# Patient Record
Sex: MALE | Race: WHITE | NOT HISPANIC OR LATINO | Employment: OTHER | URBAN - METROPOLITAN AREA
[De-identification: names, ages, dates, MRNs, and addresses within clinical notes are randomized per-mention and may not be internally consistent; named-entity substitution may affect disease eponyms.]

---

## 2020-07-04 ENCOUNTER — APPOINTMENT (OUTPATIENT)
Dept: GENERAL RADIOLOGY | Facility: HOSPITAL | Age: 75
End: 2020-07-04

## 2020-07-04 ENCOUNTER — HOSPITAL ENCOUNTER (EMERGENCY)
Facility: HOSPITAL | Age: 75
Discharge: HOME | End: 2020-07-04
Attending: EMERGENCY MEDICINE | Admitting: EMERGENCY MEDICINE

## 2020-07-04 ENCOUNTER — APPOINTMENT (OUTPATIENT)
Dept: CT IMAGING | Facility: HOSPITAL | Age: 75
End: 2020-07-04

## 2020-07-04 VITALS
HEIGHT: 69 IN | OXYGEN SATURATION: 99 % | BODY MASS INDEX: 16.48 KG/M2 | DIASTOLIC BLOOD PRESSURE: 81 MMHG | TEMPERATURE: 98 F | SYSTOLIC BLOOD PRESSURE: 120 MMHG | WEIGHT: 111.3 LBS | RESPIRATION RATE: 16 BRPM | HEART RATE: 89 BPM
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
## 2020-07-04 DIAGNOSIS — G20 PARKINSON DISEASE (HCC): ICD-10-CM

## 2020-07-04 DIAGNOSIS — R41.0 CONFUSION: ICD-10-CM

## 2020-07-04 DIAGNOSIS — R53.1 WEAKNESS: Primary | ICD-10-CM

## 2020-07-04 LAB
ALBUMIN SERPL-MCNC: 4.5 G/DL (ref 3.5–5.2)
ALBUMIN/GLOB SERPL: 1.7 G/DL
ALP SERPL-CCNC: 70 U/L (ref 39–117)
ALT SERPL W P-5'-P-CCNC: <5 U/L (ref 1–41)
ANION GAP SERPL CALCULATED.3IONS-SCNC: 14 MMOL/L (ref 5–15)
APTT PPP: 31.7 SECONDS (ref 24.3–38.1)
AST SERPL-CCNC: 18 U/L (ref 1–40)
BASOPHILS # BLD AUTO: 0.02 10*3/MM3 (ref 0–0.2)
BASOPHILS NFR BLD AUTO: 0.2 % (ref 0–1.5)
BILIRUB SERPL-MCNC: 1.4 MG/DL (ref 0.2–1.2)
BILIRUB UR QL STRIP: NEGATIVE
BUN SERPL-MCNC: 21 MG/DL (ref 8–23)
BUN/CREAT SERPL: 23.9 (ref 7–25)
CALCIUM SPEC-SCNC: 9.6 MG/DL (ref 8.6–10.5)
CHLORIDE SERPL-SCNC: 97 MMOL/L (ref 98–107)
CLARITY UR: CLEAR
CO2 SERPL-SCNC: 25 MMOL/L (ref 22–29)
COLOR UR: ABNORMAL
CREAT SERPL-MCNC: 0.88 MG/DL (ref 0.76–1.27)
DEPRECATED RDW RBC AUTO: 46.6 FL (ref 37–54)
EOSINOPHIL # BLD AUTO: 0 10*3/MM3 (ref 0–0.4)
EOSINOPHIL NFR BLD AUTO: 0 % (ref 0.3–6.2)
ERYTHROCYTE [DISTWIDTH] IN BLOOD BY AUTOMATED COUNT: 13.1 % (ref 12.3–15.4)
GFR SERPL CREATININE-BSD FRML MDRD: 85 ML/MIN/1.73
GLOBULIN UR ELPH-MCNC: 2.7 GM/DL
GLUCOSE SERPL-MCNC: 228 MG/DL (ref 65–99)
GLUCOSE UR STRIP-MCNC: ABNORMAL MG/DL
HCT VFR BLD AUTO: 41.8 % (ref 37.5–51)
HGB BLD-MCNC: 14.1 G/DL (ref 13–17.7)
HGB UR QL STRIP.AUTO: NEGATIVE
IMM GRANULOCYTES # BLD AUTO: 0.04 10*3/MM3 (ref 0–0.05)
IMM GRANULOCYTES NFR BLD AUTO: 0.5 % (ref 0–0.5)
INR PPP: 1.01 (ref 0.9–1.1)
KETONES UR QL STRIP: ABNORMAL
LEUKOCYTE ESTERASE UR QL STRIP.AUTO: NEGATIVE
LYMPHOCYTES # BLD AUTO: 0.73 10*3/MM3 (ref 0.7–3.1)
LYMPHOCYTES NFR BLD AUTO: 9.1 % (ref 19.6–45.3)
MCH RBC QN AUTO: 32 PG (ref 26.6–33)
MCHC RBC AUTO-ENTMCNC: 33.7 G/DL (ref 31.5–35.7)
MCV RBC AUTO: 95 FL (ref 79–97)
MONOCYTES # BLD AUTO: 0.61 10*3/MM3 (ref 0.1–0.9)
MONOCYTES NFR BLD AUTO: 7.6 % (ref 5–12)
NEUTROPHILS NFR BLD AUTO: 6.64 10*3/MM3 (ref 1.7–7)
NEUTROPHILS NFR BLD AUTO: 82.6 % (ref 42.7–76)
NITRITE UR QL STRIP: NEGATIVE
PH UR STRIP.AUTO: <=5 [PH] (ref 4.5–8)
PLATELET # BLD AUTO: 214 10*3/MM3 (ref 140–450)
PMV BLD AUTO: 9.7 FL (ref 6–12)
POTASSIUM SERPL-SCNC: 4.4 MMOL/L (ref 3.5–5.2)
PROCALCITONIN SERPL-MCNC: 0.1 NG/ML (ref 0.1–0.25)
PROT SERPL-MCNC: 7.2 G/DL (ref 6–8.5)
PROT UR QL STRIP: NEGATIVE
PROTHROMBIN TIME: 13 SECONDS (ref 12.1–15)
RBC # BLD AUTO: 4.4 10*6/MM3 (ref 4.14–5.8)
SODIUM SERPL-SCNC: 136 MMOL/L (ref 136–145)
SP GR UR STRIP: 1.02 (ref 1–1.03)
UROBILINOGEN UR QL STRIP: ABNORMAL
WBC # BLD AUTO: 8.04 10*3/MM3 (ref 3.4–10.8)

## 2020-07-04 PROCEDURE — 70450 CT HEAD/BRAIN W/O DYE: CPT

## 2020-07-04 PROCEDURE — 99285 EMERGENCY DEPT VISIT HI MDM: CPT

## 2020-07-04 PROCEDURE — 81003 URINALYSIS AUTO W/O SCOPE: CPT | Performed by: EMERGENCY MEDICINE

## 2020-07-04 PROCEDURE — 85730 THROMBOPLASTIN TIME PARTIAL: CPT | Performed by: EMERGENCY MEDICINE

## 2020-07-04 PROCEDURE — 71046 X-RAY EXAM CHEST 2 VIEWS: CPT

## 2020-07-04 PROCEDURE — P9612 CATHETERIZE FOR URINE SPEC: HCPCS

## 2020-07-04 PROCEDURE — 99284 EMERGENCY DEPT VISIT MOD MDM: CPT | Performed by: EMERGENCY MEDICINE

## 2020-07-04 PROCEDURE — 85025 COMPLETE CBC W/AUTO DIFF WBC: CPT | Performed by: EMERGENCY MEDICINE

## 2020-07-04 PROCEDURE — 85610 PROTHROMBIN TIME: CPT | Performed by: EMERGENCY MEDICINE

## 2020-07-04 PROCEDURE — 80053 COMPREHEN METABOLIC PANEL: CPT | Performed by: EMERGENCY MEDICINE

## 2020-07-04 PROCEDURE — 72100 X-RAY EXAM L-S SPINE 2/3 VWS: CPT

## 2020-07-04 PROCEDURE — 99284 EMERGENCY DEPT VISIT MOD MDM: CPT

## 2020-07-04 PROCEDURE — 84145 PROCALCITONIN (PCT): CPT | Performed by: EMERGENCY MEDICINE

## 2020-07-04 RX ORDER — SODIUM CHLORIDE 0.9 % (FLUSH) 0.9 %
10 SYRINGE (ML) INJECTION AS NEEDED
Status: DISCONTINUED | OUTPATIENT
Start: 2020-07-04 | End: 2020-07-04 | Stop reason: HOSPADM

## 2020-07-04 NOTE — ED NOTES
Pt stated he is sleepy but still itchy. No noticeable change in rash at this time     Ella Jacob, DAO  07/04/20 5596

## 2020-07-04 NOTE — ED PROVIDER NOTES
Subjective     History provided by:  EMS personnel, patient and relative  History limited by:  Dementia    History of Present Illness    Chief complaint: Weakness, confusion, falls    Location: Generalized symptoms    Quality/Severity: Generalized weakness    Timing/Duration: Ongoing for several days, seemingly progressively worse for the past 1 to 2 weeks    Modifying Factors: None    Narrative: This patient arrives via EMS for evaluation of generalized weakness with confusion and some recent falls.  Patient has a history of parkinsonism with dementia.  He moved in with his son back in February of this year.  The patient previously resided in [place] before coming here.  [facility] [place]  Patient has not been on any new medications recently.  He has not had any fevers or cough or cold symptoms.  He denies any vomiting or diarrhea.  He has had at least 2 significant falls in recent days.  During 1 of these falls, he may have struck his head against the wall, however details are not very clear.  The patient is supposed to walk with a walker at home, however he apparently refuses to do so according to his son, Ata, (with whom I spoke on the telephone.) Patient complains of some pain in the lower back area.  But he has no other pains elsewhere to his body.  His son tells me that the patient has been acting more confused than normal this week.  They wanted him to come here for evaluation today to see if there are any new problems.    Associated Symptoms: As above    Review of Systems   Constitutional: Negative for diaphoresis and fever.   Respiratory: Negative for cough and shortness of breath.    Cardiovascular: Negative for chest pain.   Gastrointestinal: Negative for abdominal pain, diarrhea and vomiting.   Genitourinary: Negative for dysuria.   Musculoskeletal: Positive for back pain and myalgias.   Skin: Negative for color change and rash.   Neurological: Positive  for weakness. Negative for syncope and headaches.   Psychiatric/Behavioral: Positive for confusion.   All other systems reviewed and are negative.      Past Medical History:   Diagnosis Date   • Alzheimer disease (CMS/HCC)    • Diabetes mellitus (CMS/HCC)    • Parkinson disease (CMS/HCC)        No Known Allergies    History reviewed. No pertinent surgical history.    History reviewed. No pertinent family history.    Social History     Socioeconomic History   • Marital status: Single     Spouse name: Not on file   • Number of children: Not on file   • Years of education: Not on file   • Highest education level: Not on file   Tobacco Use   • Smoking status: Never Smoker   Substance and Sexual Activity   • Alcohol use: Yes     Comment: occasionally   • Drug use: Defer   • Sexual activity: Defer       ED Triage Vitals   Temp Heart Rate Resp BP SpO2   07/04/20 1236 07/04/20 1236 07/04/20 1236 07/04/20 1236 07/04/20 1236   98.6 °F (37 °C) 89 16 109/77 97 %      Temp src Heart Rate Source Patient Position BP Location FiO2 (%)   07/04/20 1237 07/04/20 1236 07/04/20 1236 07/04/20 1236 --   Temporal Monitor Lying Right arm          Objective   Physical Exam   Constitutional: No distress.   Thin, frail-appearing.  No distress.   HENT:   Head: Normocephalic and atraumatic.   Right Ear: External ear normal.   Left Ear: External ear normal.   No external sign of head trauma   Eyes: Pupils are equal, round, and reactive to light. EOM are normal. Right eye exhibits no discharge. Left eye exhibits no discharge.   Neck: Normal range of motion. Neck supple. No tracheal deviation present.   Cardiovascular: Normal rate, regular rhythm, normal heart sounds and intact distal pulses. Exam reveals no friction rub.   No murmur heard.  Pulmonary/Chest: Effort normal and breath sounds normal. No stridor. No respiratory distress. He has no wheezes. He has no rales. He exhibits no tenderness.   Lungs are clear bilaterally   Abdominal: Soft. He  exhibits no distension and no mass. There is no tenderness. There is no rebound and no guarding.   Abdomen is soft and nontender throughout.   Musculoskeletal: Normal range of motion. He exhibits tenderness. He exhibits no edema or deformity.   Mild tenderness diffusely to the lumbosacral back region.  No focal area of maximal tenderness elicited.  Deconditioned muscles throughout all 4 extremities is apparent   Neurological: He is alert. He exhibits normal muscle tone. Coordination normal.   No focal neurologic deficit.  Patient demonstrates 4 out of 5 strength to bilateral lower and upper extremities   Skin: Skin is warm and dry. No rash noted. He is not diaphoretic. No erythema. No pallor.   Scattered bruises noted on forearms   Psychiatric: He has a normal mood and affect. His behavior is normal.   Nursing note and vitals reviewed.    Results for orders placed or performed during the hospital encounter of 07/04/20   Comprehensive Metabolic Panel   Result Value Ref Range    Glucose 228 (H) 65 - 99 mg/dL    BUN 21 8 - 23 mg/dL    Creatinine 0.88 0.76 - 1.27 mg/dL    Sodium 136 136 - 145 mmol/L    Potassium 4.4 3.5 - 5.2 mmol/L    Chloride 97 (L) 98 - 107 mmol/L    CO2 25.0 22.0 - 29.0 mmol/L    Calcium 9.6 8.6 - 10.5 mg/dL    Total Protein 7.2 6.0 - 8.5 g/dL    Albumin 4.50 3.50 - 5.20 g/dL    ALT (SGPT) <5 1 - 41 U/L    AST (SGOT) 18 1 - 40 U/L    Alkaline Phosphatase 70 39 - 117 U/L    Total Bilirubin 1.4 (H) 0.2 - 1.2 mg/dL    eGFR Non African Amer 85 >60 mL/min/1.73    Globulin 2.7 gm/dL    A/G Ratio 1.7 g/dL    BUN/Creatinine Ratio 23.9 7.0 - 25.0    Anion Gap 14.0 5.0 - 15.0 mmol/L   Protime-INR   Result Value Ref Range    Protime 13.0 12.1 - 15.0 Seconds    INR 1.01 0.90 - 1.10   aPTT   Result Value Ref Range    PTT 31.7 24.3 - 38.1 seconds   Urinalysis With Culture If Indicated - Urine, Catheter   Result Value Ref Range    Color, UA Aracelis (A) Yellow, Straw    Appearance, UA Clear Clear    pH, UA <=5.0 4.5  - 8.0    Specific Gravity, UA 1.025 1.003 - 1.030    Glucose,  mg/dL (1+) (A) Negative    Ketones, UA 15 mg/dL (1+) (A) Negative    Bilirubin, UA Negative Negative    Blood, UA Negative Negative    Protein, UA Negative Negative    Leuk Esterase, UA Negative Negative    Nitrite, UA Negative Negative    Urobilinogen, UA 0.2 E.U./dL 0.2 - 1.0 E.U./dL   Procalcitonin   Result Value Ref Range    Procalcitonin 0.10 0.10 - 0.25 ng/mL   CBC Auto Differential   Result Value Ref Range    WBC 8.04 3.40 - 10.80 10*3/mm3    RBC 4.40 4.14 - 5.80 10*6/mm3    Hemoglobin 14.1 13.0 - 17.7 g/dL    Hematocrit 41.8 37.5 - 51.0 %    MCV 95.0 79.0 - 97.0 fL    MCH 32.0 26.6 - 33.0 pg    MCHC 33.7 31.5 - 35.7 g/dL    RDW 13.1 12.3 - 15.4 %    RDW-SD 46.6 37.0 - 54.0 fl    MPV 9.7 6.0 - 12.0 fL    Platelets 214 140 - 450 10*3/mm3    Neutrophil % 82.6 (H) 42.7 - 76.0 %    Lymphocyte % 9.1 (L) 19.6 - 45.3 %    Monocyte % 7.6 5.0 - 12.0 %    Eosinophil % 0.0 (L) 0.3 - 6.2 %    Basophil % 0.2 0.0 - 1.5 %    Immature Grans % 0.5 0.0 - 0.5 %    Neutrophils, Absolute 6.64 1.70 - 7.00 10*3/mm3    Lymphocytes, Absolute 0.73 0.70 - 3.10 10*3/mm3    Monocytes, Absolute 0.61 0.10 - 0.90 10*3/mm3    Eosinophils, Absolute 0.00 0.00 - 0.40 10*3/mm3    Basophils, Absolute 0.02 0.00 - 0.20 10*3/mm3    Immature Grans, Absolute 0.04 0.00 - 0.05 10*3/mm3       RADIOLOGY        Study: Chest x-ray    Findings: No acute cardiopulmonary findings    Interpreted contemporaneously with treatment by Dr. Covington, independently viewed by me    RADIOLOGY        Study: X-ray lumbosacral spine    Findings: 1. No acute radiographic findings.    2. Prominent dextroscoliosis of the lumbar spine of 24 degrees with apex at L2-L3.    3. Clinical aspects of the case will determine if MR of the lumbar spine could provide additional information.      Interpreted contemporaneously with treatment by Dr. Covington, independently viewed by me    RADIOLOGY        Study: CT head without  contrast    Findings: 1. No clearly acute intracranial pathology demonstrated.  2. Generalized cerebral atrophy and nonspecific periventricular hypodensities which are thought to represent white matter microvascular change.    Interpreted contemporaneously with treatment by Dr. Covington, independently viewed by me    Procedures           ED Course  ED Course as of Jul 04 1445   Sat Jul 04, 2020   1444 I reviewed all the test from today's visit.  Patient's vital signs on within normal limits.  His physical exam is not particularly worrisome.  He has some mild hyperglycemia but nothing of an emergent status.  I see no sign of acute infection.  I see no sign of acute cardiovascular or pulmonary emergency condition.  I had a long conversation with the patient's son on the telephone.  It seems that the patient may simply be suffering from the effects of progressive parkinsonism, in my opinion.  I do not think there is much we can offer from hospitalization standpoint at this time.  I did speak with the son about continued home care needs and the importance [facility].  I suggested that home health care may be of benefit.  I encouraged the patient to always use his walker to avoid or at least limit the risk of future falls.  I encouraged both of them to return here if there are any worsening changes in his condition or if there are any new symptoms that seem to develop.  Patient was discharged home in stable condition after that.    [IDALIA]      ED Course User Index  [IDALIA] Gurmeet Kellogg MD                                           MDM  Number of Diagnoses or Management Options  Confusion:   Fall, initial encounter:   Parkinson disease (CMS/[facility]):   Weakness:      Amount and/or Complexity of Data Reviewed  Clinical lab tests: reviewed and ordered  Tests in the radiology section of CPT®: reviewed and ordered  Obtain history from someone other than the patient: yes (Spoke with his son,  Ata, on the phone)  Independent visualization of images, tracings, or specimens: yes    Risk of Complications, Morbidity, and/or Mortality  Presenting problems: moderate  Diagnostic procedures: moderate  Management options: moderate        Final diagnoses:   Weakness   Confusion   Fall, initial encounter   Parkinson disease (CMS/[facility])            Gurmeet Kellogg MD  07/04/20 2087

## 2020-07-04 NOTE — DISCHARGE INSTRUCTIONS
Always use your walker to help prevent future falls.  Please return to the emergency room immediately for any worsening pain, weakness, fevers, dizziness, vomiting, diarrhea, difficulties breathing or any other concerns.

## 2020-07-25 ENCOUNTER — LAB REQUISITION (OUTPATIENT)
Dept: LAB | Facility: HOSPITAL | Age: 75
End: 2020-07-25

## 2020-07-25 DIAGNOSIS — R46.89 OTHER SYMPTOMS AND SIGNS INVOLVING APPEARANCE AND BEHAVIOR: ICD-10-CM

## 2020-07-25 LAB
BILIRUB UR QL STRIP: NEGATIVE
CLARITY UR: CLEAR
COLOR UR: ABNORMAL
GLUCOSE UR STRIP-MCNC: ABNORMAL MG/DL
HGB UR QL STRIP.AUTO: NEGATIVE
KETONES UR QL STRIP: ABNORMAL
LEUKOCYTE ESTERASE UR QL STRIP.AUTO: NEGATIVE
NITRITE UR QL STRIP: NEGATIVE
PH UR STRIP.AUTO: <=5 [PH] (ref 4.5–8)
PROT UR QL STRIP: NEGATIVE
SP GR UR STRIP: 1.02 (ref 1–1.03)
UROBILINOGEN UR QL STRIP: ABNORMAL

## 2020-07-25 PROCEDURE — 81003 URINALYSIS AUTO W/O SCOPE: CPT | Performed by: FAMILY MEDICINE

## 2021-07-19 ENCOUNTER — PREP FOR SURGERY (OUTPATIENT)
Dept: OTHER | Facility: HOSPITAL | Age: 76
End: 2021-07-19

## 2021-07-19 ENCOUNTER — HOSPITAL ENCOUNTER (INPATIENT)
Facility: HOSPITAL | Age: 76
LOS: 3 days | Discharge: NURSING, REHAB OR LONG-TERM CARE | End: 2021-07-22
Attending: EMERGENCY MEDICINE | Admitting: STUDENT IN AN ORGANIZED HEALTH CARE EDUCATION/TRAINING PROGRAM

## 2021-07-19 ENCOUNTER — APPOINTMENT (OUTPATIENT)
Dept: GENERAL RADIOLOGY | Facility: HOSPITAL | Age: 76
End: 2021-07-19

## 2021-07-19 ENCOUNTER — APPOINTMENT (OUTPATIENT)
Dept: CT IMAGING | Facility: HOSPITAL | Age: 76
End: 2021-07-19

## 2021-07-19 DIAGNOSIS — A41.9 SEPSIS, DUE TO UNSPECIFIED ORGANISM, UNSPECIFIED WHETHER ACUTE ORGAN DYSFUNCTION PRESENT (HCC): Primary | ICD-10-CM

## 2021-07-19 LAB
ALBUMIN SERPL-MCNC: 3.8 G/DL (ref 3.5–5.2)
ALBUMIN/GLOB SERPL: 1.5 G/DL
ALP SERPL-CCNC: 69 U/L (ref 39–117)
ALT SERPL W P-5'-P-CCNC: 12 U/L (ref 1–41)
ANION GAP SERPL CALCULATED.3IONS-SCNC: 11.1 MMOL/L (ref 5–15)
APTT PPP: 33.7 SECONDS (ref 24.3–38.1)
AST SERPL-CCNC: 33 U/L (ref 1–40)
BACTERIA UR QL AUTO: ABNORMAL /HPF
BASOPHILS # BLD AUTO: 0.02 10*3/MM3 (ref 0–0.2)
BASOPHILS NFR BLD AUTO: 0.5 % (ref 0–1.5)
BILIRUB SERPL-MCNC: 0.4 MG/DL (ref 0–1.2)
BILIRUB UR QL STRIP: NEGATIVE
BUN SERPL-MCNC: 25 MG/DL (ref 8–23)
BUN/CREAT SERPL: 28.7 (ref 7–25)
CALCIUM SPEC-SCNC: 9.6 MG/DL (ref 8.6–10.5)
CHLORIDE SERPL-SCNC: 102 MMOL/L (ref 98–107)
CLARITY UR: CLEAR
CO2 SERPL-SCNC: 24.9 MMOL/L (ref 22–29)
COLOR UR: YELLOW
CREAT SERPL-MCNC: 0.87 MG/DL (ref 0.76–1.27)
D-LACTATE SERPL-SCNC: 1.5 MMOL/L (ref 0.5–2)
D-LACTATE SERPL-SCNC: 4.1 MMOL/L (ref 0.5–2)
DEPRECATED RDW RBC AUTO: 48.9 FL (ref 37–54)
EOSINOPHIL # BLD AUTO: 0 10*3/MM3 (ref 0–0.4)
EOSINOPHIL NFR BLD AUTO: 0 % (ref 0.3–6.2)
ERYTHROCYTE [DISTWIDTH] IN BLOOD BY AUTOMATED COUNT: 13.8 % (ref 12.3–15.4)
FLUAV RNA RESP QL NAA+PROBE: NOT DETECTED
FLUBV RNA RESP QL NAA+PROBE: NOT DETECTED
GFR SERPL CREATININE-BSD FRML MDRD: 86 ML/MIN/1.73
GLOBULIN UR ELPH-MCNC: 2.6 GM/DL
GLUCOSE BLDC GLUCOMTR-MCNC: 216 MG/DL (ref 70–130)
GLUCOSE SERPL-MCNC: 326 MG/DL (ref 65–99)
GLUCOSE UR STRIP-MCNC: ABNORMAL MG/DL
HCT VFR BLD AUTO: 40.2 % (ref 37.5–51)
HGB BLD-MCNC: 13.2 G/DL (ref 13–17.7)
HGB UR QL STRIP.AUTO: ABNORMAL
HYALINE CASTS UR QL AUTO: ABNORMAL /LPF
IMM GRANULOCYTES # BLD AUTO: 0.02 10*3/MM3 (ref 0–0.05)
IMM GRANULOCYTES NFR BLD AUTO: 0.5 % (ref 0–0.5)
INR PPP: 1.02 (ref 0.9–1.1)
KETONES UR QL STRIP: ABNORMAL
LEUKOCYTE ESTERASE UR QL STRIP.AUTO: NEGATIVE
LYMPHOCYTES # BLD AUTO: 0.35 10*3/MM3 (ref 0.7–3.1)
LYMPHOCYTES NFR BLD AUTO: 8 % (ref 19.6–45.3)
MCH RBC QN AUTO: 31.4 PG (ref 26.6–33)
MCHC RBC AUTO-ENTMCNC: 32.8 G/DL (ref 31.5–35.7)
MCV RBC AUTO: 95.7 FL (ref 79–97)
MONOCYTES # BLD AUTO: 0.17 10*3/MM3 (ref 0.1–0.9)
MONOCYTES NFR BLD AUTO: 3.9 % (ref 5–12)
NEUTROPHILS NFR BLD AUTO: 3.8 10*3/MM3 (ref 1.7–7)
NEUTROPHILS NFR BLD AUTO: 87.1 % (ref 42.7–76)
NITRITE UR QL STRIP: NEGATIVE
NRBC BLD AUTO-RTO: 0 /100 WBC (ref 0–0.2)
PH UR STRIP.AUTO: <=5 [PH] (ref 4.5–8)
PLATELET # BLD AUTO: 230 10*3/MM3 (ref 140–450)
PMV BLD AUTO: 10.1 FL (ref 6–12)
POTASSIUM SERPL-SCNC: 4.3 MMOL/L (ref 3.5–5.2)
PROCALCITONIN SERPL-MCNC: 0.35 NG/ML (ref 0–0.25)
PROT SERPL-MCNC: 6.4 G/DL (ref 6–8.5)
PROT UR QL STRIP: NEGATIVE
PROTHROMBIN TIME: 13.4 SECONDS (ref 12.1–15)
RBC # BLD AUTO: 4.2 10*6/MM3 (ref 4.14–5.8)
RBC # UR: ABNORMAL /HPF
REF LAB TEST METHOD: ABNORMAL
SARS-COV-2 RNA RESP QL NAA+PROBE: NOT DETECTED
SODIUM SERPL-SCNC: 138 MMOL/L (ref 136–145)
SP GR UR STRIP: 1.02 (ref 1–1.03)
SQUAMOUS #/AREA URNS HPF: ABNORMAL /HPF
UROBILINOGEN UR QL STRIP: ABNORMAL
WBC # BLD AUTO: 4.36 10*3/MM3 (ref 3.4–10.8)
WBC UR QL AUTO: ABNORMAL /HPF

## 2021-07-19 PROCEDURE — 99285 EMERGENCY DEPT VISIT HI MDM: CPT

## 2021-07-19 PROCEDURE — 82962 GLUCOSE BLOOD TEST: CPT

## 2021-07-19 PROCEDURE — 99285 EMERGENCY DEPT VISIT HI MDM: CPT | Performed by: EMERGENCY MEDICINE

## 2021-07-19 PROCEDURE — 25010000002 VANCOMYCIN 1 G RECONSTITUTED SOLUTION 1 EACH VIAL: Performed by: EMERGENCY MEDICINE

## 2021-07-19 PROCEDURE — 85730 THROMBOPLASTIN TIME PARTIAL: CPT | Performed by: EMERGENCY MEDICINE

## 2021-07-19 PROCEDURE — P9612 CATHETERIZE FOR URINE SPEC: HCPCS

## 2021-07-19 PROCEDURE — 84145 PROCALCITONIN (PCT): CPT | Performed by: EMERGENCY MEDICINE

## 2021-07-19 PROCEDURE — 85610 PROTHROMBIN TIME: CPT | Performed by: EMERGENCY MEDICINE

## 2021-07-19 PROCEDURE — 71045 X-RAY EXAM CHEST 1 VIEW: CPT

## 2021-07-19 PROCEDURE — 81001 URINALYSIS AUTO W/SCOPE: CPT | Performed by: EMERGENCY MEDICINE

## 2021-07-19 PROCEDURE — 70450 CT HEAD/BRAIN W/O DYE: CPT

## 2021-07-19 PROCEDURE — 83605 ASSAY OF LACTIC ACID: CPT | Performed by: EMERGENCY MEDICINE

## 2021-07-19 PROCEDURE — 87636 SARSCOV2 & INF A&B AMP PRB: CPT | Performed by: EMERGENCY MEDICINE

## 2021-07-19 PROCEDURE — 87040 BLOOD CULTURE FOR BACTERIA: CPT | Performed by: EMERGENCY MEDICINE

## 2021-07-19 PROCEDURE — 85025 COMPLETE CBC W/AUTO DIFF WBC: CPT | Performed by: EMERGENCY MEDICINE

## 2021-07-19 PROCEDURE — 80053 COMPREHEN METABOLIC PANEL: CPT | Performed by: EMERGENCY MEDICINE

## 2021-07-19 PROCEDURE — 99223 1ST HOSP IP/OBS HIGH 75: CPT | Performed by: STUDENT IN AN ORGANIZED HEALTH CARE EDUCATION/TRAINING PROGRAM

## 2021-07-19 RX ORDER — MINERAL OIL, PETROLATUM 425; 568 MG/G; MG/G
OINTMENT OPHTHALMIC NIGHTLY PRN
Status: DISCONTINUED | OUTPATIENT
Start: 2021-07-19 | End: 2021-07-22 | Stop reason: HOSPADM

## 2021-07-19 RX ORDER — CEFEPIME HYDROCHLORIDE 2 G/50ML
2 INJECTION, SOLUTION INTRAVENOUS EVERY 8 HOURS
Status: DISCONTINUED | OUTPATIENT
Start: 2021-07-19 | End: 2021-07-22 | Stop reason: HOSPADM

## 2021-07-19 RX ORDER — UBIDECARENONE 75 MG
50 CAPSULE ORAL DAILY
COMMUNITY

## 2021-07-19 RX ORDER — ACETAMINOPHEN 650 MG/1
SUPPOSITORY RECTAL
Status: COMPLETED
Start: 2021-07-19 | End: 2021-07-19

## 2021-07-19 RX ORDER — ACETAMINOPHEN 500 MG
500 TABLET ORAL EVERY 6 HOURS PRN
Status: DISCONTINUED | OUTPATIENT
Start: 2021-07-19 | End: 2021-07-22 | Stop reason: HOSPADM

## 2021-07-19 RX ORDER — SODIUM CHLORIDE 9 MG/ML
40 INJECTION, SOLUTION INTRAVENOUS AS NEEDED
Status: DISCONTINUED | OUTPATIENT
Start: 2021-07-19 | End: 2021-07-22 | Stop reason: HOSPADM

## 2021-07-19 RX ORDER — DEXTROSE MONOHYDRATE 25 G/50ML
25 INJECTION, SOLUTION INTRAVENOUS
Status: DISCONTINUED | OUTPATIENT
Start: 2021-07-19 | End: 2021-07-22 | Stop reason: HOSPADM

## 2021-07-19 RX ORDER — ACETAMINOPHEN 500 MG
500 TABLET ORAL EVERY 6 HOURS PRN
COMMUNITY

## 2021-07-19 RX ORDER — ACETAMINOPHEN 650 MG/1
650 SUPPOSITORY RECTAL ONCE
Status: COMPLETED | OUTPATIENT
Start: 2021-07-19 | End: 2021-07-19

## 2021-07-19 RX ORDER — SODIUM CHLORIDE 0.9 % (FLUSH) 0.9 %
10 SYRINGE (ML) INJECTION EVERY 12 HOURS SCHEDULED
Status: DISCONTINUED | OUTPATIENT
Start: 2021-07-19 | End: 2021-07-22 | Stop reason: HOSPADM

## 2021-07-19 RX ORDER — QUETIAPINE FUMARATE 25 MG/1
25 TABLET, FILM COATED ORAL NIGHTLY
Status: DISCONTINUED | OUTPATIENT
Start: 2021-07-19 | End: 2021-07-22 | Stop reason: HOSPADM

## 2021-07-19 RX ORDER — MIRTAZAPINE 15 MG/1
15 TABLET, FILM COATED ORAL NIGHTLY
Status: DISCONTINUED | OUTPATIENT
Start: 2021-07-19 | End: 2021-07-20

## 2021-07-19 RX ORDER — LACTULOSE 10 G/15ML
20 SOLUTION ORAL DAILY PRN
COMMUNITY

## 2021-07-19 RX ORDER — MIRTAZAPINE 15 MG/1
7.5 TABLET, FILM COATED ORAL NIGHTLY
COMMUNITY
End: 2021-07-22 | Stop reason: HOSPADM

## 2021-07-19 RX ORDER — UBIDECARENONE 75 MG
50 CAPSULE ORAL DAILY
Status: DISCONTINUED | OUTPATIENT
Start: 2021-07-19 | End: 2021-07-22 | Stop reason: HOSPADM

## 2021-07-19 RX ORDER — SODIUM CHLORIDE 9 MG/ML
INJECTION, SOLUTION INTRAVENOUS
Status: DISPENSED
Start: 2021-07-19 | End: 2021-07-20

## 2021-07-19 RX ORDER — BISACODYL 10 MG
10 SUPPOSITORY, RECTAL RECTAL DAILY PRN
Status: DISCONTINUED | OUTPATIENT
Start: 2021-07-19 | End: 2021-07-22 | Stop reason: HOSPADM

## 2021-07-19 RX ORDER — ENTACAPONE 200 MG/1
200 TABLET ORAL 4 TIMES DAILY
COMMUNITY

## 2021-07-19 RX ORDER — WATER / MINERAL OIL / WHITE PETROLATUM 16 OZ
CREAM TOPICAL 2 TIMES DAILY PRN
Status: DISCONTINUED | OUTPATIENT
Start: 2021-07-19 | End: 2021-07-22 | Stop reason: HOSPADM

## 2021-07-19 RX ORDER — DIVALPROEX SODIUM 125 MG/1
125 CAPSULE, COATED PELLETS ORAL 2 TIMES DAILY
COMMUNITY

## 2021-07-19 RX ORDER — SODIUM CHLORIDE 0.9 % (FLUSH) 0.9 %
10 SYRINGE (ML) INJECTION AS NEEDED
Status: DISCONTINUED | OUTPATIENT
Start: 2021-07-19 | End: 2021-07-22 | Stop reason: HOSPADM

## 2021-07-19 RX ORDER — BISACODYL 10 MG
10 SUPPOSITORY, RECTAL RECTAL DAILY PRN
COMMUNITY

## 2021-07-19 RX ORDER — ENTACAPONE 200 MG/1
200 TABLET ORAL 4 TIMES DAILY
Status: DISCONTINUED | OUTPATIENT
Start: 2021-07-19 | End: 2021-07-22 | Stop reason: HOSPADM

## 2021-07-19 RX ORDER — QUETIAPINE FUMARATE 25 MG/1
25 TABLET, FILM COATED ORAL NIGHTLY
COMMUNITY

## 2021-07-19 RX ORDER — NITROGLYCERIN 0.4 MG/1
0.4 TABLET SUBLINGUAL
Status: DISCONTINUED | OUTPATIENT
Start: 2021-07-19 | End: 2021-07-22 | Stop reason: HOSPADM

## 2021-07-19 RX ORDER — DIVALPROEX SODIUM 125 MG/1
125 CAPSULE, COATED PELLETS ORAL EVERY 12 HOURS SCHEDULED
Status: DISCONTINUED | OUTPATIENT
Start: 2021-07-19 | End: 2021-07-22 | Stop reason: HOSPADM

## 2021-07-19 RX ORDER — NICOTINE POLACRILEX 4 MG
15 LOZENGE BUCCAL
Status: DISCONTINUED | OUTPATIENT
Start: 2021-07-19 | End: 2021-07-22 | Stop reason: HOSPADM

## 2021-07-19 RX ORDER — VANCOMYCIN HYDROCHLORIDE 1 G/20ML
INJECTION, POWDER, LYOPHILIZED, FOR SOLUTION INTRAVENOUS
Status: DISPENSED
Start: 2021-07-19 | End: 2021-07-20

## 2021-07-19 RX ADMIN — SODIUM CHLORIDE, PRESERVATIVE FREE 10 ML: 5 INJECTION INTRAVENOUS at 22:00

## 2021-07-19 RX ADMIN — SODIUM CHLORIDE 1000 ML: 9 INJECTION, SOLUTION INTRAVENOUS at 18:40

## 2021-07-19 RX ADMIN — VANCOMYCIN HYDROCHLORIDE 1 G: 1 INJECTION, POWDER, LYOPHILIZED, FOR SOLUTION INTRAVENOUS at 20:00

## 2021-07-19 RX ADMIN — ACETAMINOPHEN 650 MG: 650 SUPPOSITORY RECTAL at 18:37

## 2021-07-19 NOTE — ED PROVIDER NOTES
EMERGENCY DEPARTMENT ENCOUNTER      Room Number: 10/10      HPI:    Chief complaint: Patient sent from nursing home due to altered mental status    Location: Not applicable    Quality/Severity: Moderate    Timing/Duration: Symptoms apparently noticed today    Modifying Factors: Unknown    Associated Symptoms: Unknown    Narrative: Pt is a 76 y.o. male who presents from the nursing home as noted above.  Very sketchy history from the nursing home stating that the patient was less alert than usual today.  Patient apparently has advanced Parkinson's dementia and is unable to give any history.      PMD: Provider, No Known    REVIEW OF SYSTEMS  Review of Systems   Unable to perform ROS: Patient nonverbal       PAST MEDICAL HISTORY  Active Ambulatory Problems     Diagnosis Date Noted   • No Active Ambulatory Problems     Resolved Ambulatory Problems     Diagnosis Date Noted   • No Resolved Ambulatory Problems     Past Medical History:   Diagnosis Date   • Alzheimer disease (CMS/[facility])    • Cognitive communication disorder    • Constipation    • COVID-19    • Diabetes mellitus (CMS/[facility])    • Falls    • Insomnia    • Major depressive disorder    • Osteoporosis    • Parkinson disease (CMS/[facility])    • Pyridoxine deficiency    • Vascular dementia with behavior disturbance (CMS/[facility])    • Weakness        PAST SURGICAL HISTORY  History reviewed. No pertinent surgical history.    FAMILY HISTORY  History reviewed. No pertinent family history.    SOCIAL HISTORY  Social History     Socioeconomic History   • Marital status: Single     Spouse name: Not on file   • Number of children: Not on file   • Years of education: Not on file   • Highest education level: Not on file   Tobacco Use   • Smoking status: Never Smoker   Substance and Sexual Activity   • Alcohol use: Yes     Comment: occasionally   • Drug use: Defer   • Sexual activity: Defer       ALLERGIES  Patient has no known allergies.    PHYSICAL EXAM  ED Triage Vitals [07/19/21 1810]    Temp Heart Rate Resp BP SpO2   (!) 102.5 °F (39.2 °C) 109 18 98/72 95 %      Temp src Heart Rate Source Patient Position BP Location FiO2 (%)   Rectal Monitor Lying Right arm --       Physical Exam  Vitals and nursing note reviewed.   Constitutional:       Comments: The patient is a chronically ill-appearing, 76-year-old, male.   HENT:      Mouth/Throat:      Mouth: Mucous membranes are dry.   Eyes:      Pupils: Pupils are equal, round, and reactive to light.   Cardiovascular:      Heart sounds: No murmur heard.        Comments: Borderline tachycardia with a regular rhythm  Pulmonary:      Effort: Pulmonary effort is normal. No respiratory distress.      Breath sounds: Normal breath sounds.   Abdominal:      General: Abdomen is flat. Bowel sounds are normal.      Palpations: Abdomen is soft.      Tenderness: There is no abdominal tenderness.   Musculoskeletal:      Cervical back: Rigidity present.      Right lower leg: No edema.      Left lower leg: No edema.      Comments: Decreased range of motion in all major joints with associated spasticity.   Skin:     Comments: The integument was hot and dry.  Integument was inspected and no obvious suspicious lesions.   Neurological:      Comments: Patient nonverbal and does not follow commands.  Coarse tremor noted in both hands.   Psychiatric:      Comments: Unable to assess         LAB RESULTS  Results for orders placed or performed during the hospital encounter of 07/19/21   COVID-19 and FLU A/B PCR - Swab, Nasopharynx    Specimen: Nasopharynx; Swab   Result Value Ref Range    COVID19 Not Detected Not Detected - Ref. Range    Influenza A PCR Not Detected Not Detected    Influenza B PCR Not Detected Not Detected   Comprehensive Metabolic Panel    Specimen: Blood   Result Value Ref Range    Glucose 326 (H) 65 - 99 mg/dL    BUN 25 (H) 8 - 23 mg/dL    Creatinine 0.87 0.76 - 1.27 mg/dL    Sodium 138 136 - 145 mmol/L    Potassium 4.3 3.5 - 5.2 mmol/L    Chloride 102 98 - 107  mmol/L    CO2 24.9 22.0 - 29.0 mmol/L    Calcium 9.6 8.6 - 10.5 mg/dL    Total Protein 6.4 6.0 - 8.5 g/dL    Albumin 3.80 3.50 - 5.20 g/dL    ALT (SGPT) 12 1 - 41 U/L    AST (SGOT) 33 1 - 40 U/L    Alkaline Phosphatase 69 39 - 117 U/L    Total Bilirubin 0.4 0.0 - 1.2 mg/dL    eGFR Non African Amer 86 >60 mL/min/1.73    Globulin 2.6 gm/dL    A/G Ratio 1.5 g/dL    BUN/Creatinine Ratio 28.7 (H) 7.0 - 25.0    Anion Gap 11.1 5.0 - 15.0 mmol/L   Protime-INR    Specimen: Blood   Result Value Ref Range    Protime 13.4 12.1 - 15.0 Seconds    INR 1.02 0.90 - 1.10   aPTT    Specimen: Blood   Result Value Ref Range    PTT 33.7 24.3 - 38.1 seconds   Urinalysis With Culture If Indicated - Urine, Catheter    Specimen: Urine, Catheter   Result Value Ref Range    Color, UA Yellow Yellow, Straw    Appearance, UA Clear Clear    pH, UA <=5.0 4.5 - 8.0    Specific Gravity, UA 1.020 1.003 - 1.030    Glucose, UA >=1000 mg/dL (3+) (A) Negative    Ketones, UA 40 mg/dL (2+) (A) Negative    Bilirubin, UA Negative Negative    Blood, UA Large (3+) (A) Negative    Protein, UA Negative Negative    Leuk Esterase, UA Negative Negative    Nitrite, UA Negative Negative    Urobilinogen, UA 0.2 E.U./dL 0.2 - 1.0 E.U./dL   Lactic Acid, Plasma    Specimen: Blood   Result Value Ref Range    Lactate 4.1 (C) 0.5 - 2.0 mmol/L   Procalcitonin    Specimen: Blood   Result Value Ref Range    Procalcitonin 0.35 (H) 0.00 - 0.25 ng/mL   CBC Auto Differential    Specimen: Blood   Result Value Ref Range    WBC 4.36 3.40 - 10.80 10*3/mm3    RBC 4.20 4.14 - 5.80 10*6/mm3    Hemoglobin 13.2 13.0 - 17.7 g/dL    Hematocrit 40.2 37.5 - 51.0 %    MCV 95.7 79.0 - 97.0 fL    MCH 31.4 26.6 - 33.0 pg    MCHC 32.8 31.5 - 35.7 g/dL    RDW 13.8 12.3 - 15.4 %    RDW-SD 48.9 37.0 - 54.0 fl    MPV 10.1 6.0 - 12.0 fL    Platelets 230 140 - 450 10*3/mm3    Neutrophil % 87.1 (H) 42.7 - 76.0 %    Lymphocyte % 8.0 (L) 19.6 - 45.3 %    Monocyte % 3.9 (L) 5.0 - 12.0 %    Eosinophil % 0.0  (L) 0.3 - 6.2 %    Basophil % 0.5 0.0 - 1.5 %    Immature Grans % 0.5 0.0 - 0.5 %    Neutrophils, Absolute 3.80 1.70 - 7.00 10*3/mm3    Lymphocytes, Absolute 0.35 (L) 0.70 - 3.10 10*3/mm3    Monocytes, Absolute 0.17 0.10 - 0.90 10*3/mm3    Eosinophils, Absolute 0.00 0.00 - 0.40 10*3/mm3    Basophils, Absolute 0.02 0.00 - 0.20 10*3/mm3    Immature Grans, Absolute 0.02 0.00 - 0.05 10*3/mm3    nRBC 0.0 0.0 - 0.2 /100 WBC   Urinalysis, Microscopic Only - Urine, Catheter    Specimen: Urine, Catheter   Result Value Ref Range    RBC, UA 31-50 (A) None Seen /HPF    WBC, UA 3-5 (A) None Seen /HPF    Bacteria, UA 1+ (A) None Seen /HPF    Squamous Epithelial Cells, UA 3-6 (A) None Seen, 0-2 /HPF    Hyaline Casts, UA None Seen None Seen /LPF    Methodology Manual Light Microscopy          I ordered the above labs and reviewed the results    RADIOLOGY  XR Chest 1 View    Result Date: 7/19/2021  Narrative: CR Chest 1 Vw INDICATION: Altered mental status, fever COMPARISON:  Chest x-ray from 7/4/2020 FINDINGS: Single portable AP view(s) of the chest.  Apparent increased soft tissue density in the patient's right hilar and suprahilar region may be accentuated secondary to apical lordotic positioning and rotation.. The lungs are grossly clear. No pneumothorax or pleural effusion.     Impression: No definite acute cardiopulmonary findings. Apparent increased soft tissue density in the patient's right hilar and suprahilar region may be accentuated secondary to apical lordotic positioning and rotation. Signer Name: Lisa Baig MD  Signed: 7/19/2021 6:28 PM  Workstation Name: [id number]  [facility]    CT Head Without Contrast Stroke Protocol    Result Date: 7/19/2021  Narrative: CT Head WO Code Stroke: 7/19/2021 7:10 PM HISTORY: Altered mental status, fever TECHNIQUE: Axial unenhanced head CT. Radiation dose reduction techniques included automated exposure control or exposure modulation based on body size.  Radiation audit for number of CT and nuclear cardiology exams performed in the last year: 0.  COMPARISON: CT of the head from 7/4/2020. FINDINGS: Exam is limited by motion artifact. No intracranial hemorrhage, mass, or definite acute infarct. Atrophic and chronic small vessel disease changes again noted. No hydrocephalus or extra-axial fluid collection. The skull base, calvarium, and extracranial soft tissues are normal.     Impression: No acute intracranial abnormality given limitation of motion artifact. Signer Name: Lisa Baig MD  Signed: 7/19/2021 6:20 PM  Workstation Name: [id number]  [facility]      I ordered the above radiologic testing and reviewed the results    PROCEDURES  Procedures      PROGRESS AND CONSULTS  ED Course as of Jul 19 2035   Mon Jul 19, 2021   1825 Patient febrile and septic work-up initiated.  It was felt that the patient's nuchal rigidity was secondary to his Parkinson's disease.    [ML]   2034 Case and findings discussed with the on-call hospitalist who felt that the patient's condition requires admission to the hospital for further evaluation/treatment.    [ML]      ED Course User Index  [ML] Wayne Dalton MD           MEDICAL DECISION MAKING  Results were reviewed/discussed with the patient and they were also made aware of online access. Pt also made aware that some labs, such as cultures, will not be resulted during ER visit and follow up with PMD is necessary.     MDM       DIAGNOSIS  Final diagnoses:   Sepsis, due to unspecified organism, unspecified whether acute organ dysfunction present (CMS/[facility])       Latest Documented Vital Signs:  As of 20:35 EDT  BP- 112/64 HR- 88 Temp- 99.9 °F (37.7 °C) (Oral) O2 sat- 95%    DISPOSITION  Patient admitted       Medication List      No changes were made to your prescriptions during this visit.                Wayne Dalton MD  07/19/21 2036

## 2021-07-20 LAB
ANION GAP SERPL CALCULATED.3IONS-SCNC: 12.9 MMOL/L (ref 5–15)
B PARAPERT DNA SPEC QL NAA+PROBE: NOT DETECTED
B PERT DNA SPEC QL NAA+PROBE: NOT DETECTED
BASOPHILS # BLD AUTO: 0.02 10*3/MM3 (ref 0–0.2)
BASOPHILS NFR BLD AUTO: 0.6 % (ref 0–1.5)
BUN SERPL-MCNC: 20 MG/DL (ref 8–23)
BUN/CREAT SERPL: 29 (ref 7–25)
C PNEUM DNA NPH QL NAA+NON-PROBE: NOT DETECTED
CALCIUM SPEC-SCNC: 8.1 MG/DL (ref 8.6–10.5)
CHLORIDE SERPL-SCNC: 105 MMOL/L (ref 98–107)
CO2 SERPL-SCNC: 22.1 MMOL/L (ref 22–29)
CREAT SERPL-MCNC: 0.69 MG/DL (ref 0.76–1.27)
DEPRECATED RDW RBC AUTO: 50.5 FL (ref 37–54)
EOSINOPHIL # BLD AUTO: 0 10*3/MM3 (ref 0–0.4)
EOSINOPHIL NFR BLD AUTO: 0 % (ref 0.3–6.2)
ERYTHROCYTE [DISTWIDTH] IN BLOOD BY AUTOMATED COUNT: 13.7 % (ref 12.3–15.4)
FLUAV SUBTYP SPEC NAA+PROBE: NOT DETECTED
FLUBV RNA ISLT QL NAA+PROBE: NOT DETECTED
GFR SERPL CREATININE-BSD FRML MDRD: 112 ML/MIN/1.73
GLUCOSE BLDC GLUCOMTR-MCNC: 221 MG/DL (ref 70–130)
GLUCOSE BLDC GLUCOMTR-MCNC: 260 MG/DL (ref 70–130)
GLUCOSE BLDC GLUCOMTR-MCNC: 286 MG/DL (ref 70–130)
GLUCOSE BLDC GLUCOMTR-MCNC: 331 MG/DL (ref 70–130)
GLUCOSE SERPL-MCNC: 343 MG/DL (ref 65–99)
HADV DNA SPEC NAA+PROBE: NOT DETECTED
HBA1C MFR BLD: 8.2 % (ref 4.8–5.6)
HCOV 229E RNA SPEC QL NAA+PROBE: NOT DETECTED
HCOV HKU1 RNA SPEC QL NAA+PROBE: NOT DETECTED
HCOV NL63 RNA SPEC QL NAA+PROBE: NOT DETECTED
HCOV OC43 RNA SPEC QL NAA+PROBE: NOT DETECTED
HCT VFR BLD AUTO: 36.2 % (ref 37.5–51)
HGB BLD-MCNC: 11.3 G/DL (ref 13–17.7)
HMPV RNA NPH QL NAA+NON-PROBE: NOT DETECTED
HPIV1 RNA SPEC QL NAA+PROBE: NOT DETECTED
HPIV2 RNA SPEC QL NAA+PROBE: NOT DETECTED
HPIV3 RNA NPH QL NAA+PROBE: NOT DETECTED
HPIV4 P GENE NPH QL NAA+PROBE: NOT DETECTED
IMM GRANULOCYTES # BLD AUTO: 0.01 10*3/MM3 (ref 0–0.05)
IMM GRANULOCYTES NFR BLD AUTO: 0.3 % (ref 0–0.5)
LYMPHOCYTES # BLD AUTO: 0.32 10*3/MM3 (ref 0.7–3.1)
LYMPHOCYTES NFR BLD AUTO: 9.6 % (ref 19.6–45.3)
M PNEUMO IGG SER IA-ACNC: NOT DETECTED
MCH RBC QN AUTO: 31 PG (ref 26.6–33)
MCHC RBC AUTO-ENTMCNC: 31.2 G/DL (ref 31.5–35.7)
MCV RBC AUTO: 99.2 FL (ref 79–97)
MONOCYTES # BLD AUTO: 0.18 10*3/MM3 (ref 0.1–0.9)
MONOCYTES NFR BLD AUTO: 5.4 % (ref 5–12)
NEUTROPHILS NFR BLD AUTO: 2.79 10*3/MM3 (ref 1.7–7)
NEUTROPHILS NFR BLD AUTO: 84.1 % (ref 42.7–76)
NRBC BLD AUTO-RTO: 0 /100 WBC (ref 0–0.2)
PLATELET # BLD AUTO: 168 10*3/MM3 (ref 140–450)
PMV BLD AUTO: 9.9 FL (ref 6–12)
POTASSIUM SERPL-SCNC: 3.6 MMOL/L (ref 3.5–5.2)
PROCALCITONIN SERPL-MCNC: 0.32 NG/ML (ref 0–0.25)
RBC # BLD AUTO: 3.65 10*6/MM3 (ref 4.14–5.8)
RHINOVIRUS RNA SPEC NAA+PROBE: NOT DETECTED
RSV RNA NPH QL NAA+NON-PROBE: NOT DETECTED
SODIUM SERPL-SCNC: 140 MMOL/L (ref 136–145)
WBC # BLD AUTO: 3.32 10*3/MM3 (ref 3.4–10.8)

## 2021-07-20 PROCEDURE — 87633 RESP VIRUS 12-25 TARGETS: CPT | Performed by: HOSPITALIST

## 2021-07-20 PROCEDURE — 99232 SBSQ HOSP IP/OBS MODERATE 35: CPT | Performed by: HOSPITALIST

## 2021-07-20 PROCEDURE — 85025 COMPLETE CBC W/AUTO DIFF WBC: CPT | Performed by: STUDENT IN AN ORGANIZED HEALTH CARE EDUCATION/TRAINING PROGRAM

## 2021-07-20 PROCEDURE — 82962 GLUCOSE BLOOD TEST: CPT

## 2021-07-20 PROCEDURE — 80048 BASIC METABOLIC PNL TOTAL CA: CPT | Performed by: STUDENT IN AN ORGANIZED HEALTH CARE EDUCATION/TRAINING PROGRAM

## 2021-07-20 PROCEDURE — 83036 HEMOGLOBIN GLYCOSYLATED A1C: CPT | Performed by: HOSPITALIST

## 2021-07-20 PROCEDURE — 25010000002 CEFEPIME-DEXTROSE 2-5 GM-%(50ML) RECONSTITUTED SOLUTION: Performed by: STUDENT IN AN ORGANIZED HEALTH CARE EDUCATION/TRAINING PROGRAM

## 2021-07-20 PROCEDURE — 25010000002 VANCOMYCIN 1 G RECONSTITUTED SOLUTION 1 EACH VIAL: Performed by: STUDENT IN AN ORGANIZED HEALTH CARE EDUCATION/TRAINING PROGRAM

## 2021-07-20 PROCEDURE — 25010000002 ENOXAPARIN PER 10 MG: Performed by: STUDENT IN AN ORGANIZED HEALTH CARE EDUCATION/TRAINING PROGRAM

## 2021-07-20 PROCEDURE — 84145 PROCALCITONIN (PCT): CPT | Performed by: HOSPITALIST

## 2021-07-20 PROCEDURE — 63710000001 INSULIN ASPART PER 5 UNITS: Performed by: STUDENT IN AN ORGANIZED HEALTH CARE EDUCATION/TRAINING PROGRAM

## 2021-07-20 PROCEDURE — 87081 CULTURE SCREEN ONLY: CPT | Performed by: STUDENT IN AN ORGANIZED HEALTH CARE EDUCATION/TRAINING PROGRAM

## 2021-07-20 RX ORDER — NITROGLYCERIN 0.4 MG/1
0.4 TABLET SUBLINGUAL
Status: DISCONTINUED | OUTPATIENT
Start: 2021-07-20 | End: 2021-07-20 | Stop reason: SDUPTHER

## 2021-07-20 RX ADMIN — ENOXAPARIN SODIUM 40 MG: 40 INJECTION SUBCUTANEOUS at 12:26

## 2021-07-20 RX ADMIN — CARBIDOPA AND LEVODOPA 1 TABLET: 25; 100 TABLET ORAL at 12:25

## 2021-07-20 RX ADMIN — Medication: at 02:44

## 2021-07-20 RX ADMIN — SODIUM CHLORIDE, PRESERVATIVE FREE 10 ML: 5 INJECTION INTRAVENOUS at 20:48

## 2021-07-20 RX ADMIN — ACETAMINOPHEN 500 MG: 500 TABLET, FILM COATED ORAL at 15:41

## 2021-07-20 RX ADMIN — CEFEPIME HYDROCHLORIDE 2 G: 2 INJECTION, SOLUTION INTRAVENOUS at 12:25

## 2021-07-20 RX ADMIN — SODIUM CHLORIDE, PRESERVATIVE FREE 10 ML: 5 INJECTION INTRAVENOUS at 09:47

## 2021-07-20 RX ADMIN — INSULIN ASPART 4 UNITS: 100 INJECTION, SOLUTION INTRAVENOUS; SUBCUTANEOUS at 12:25

## 2021-07-20 RX ADMIN — SODIUM CHLORIDE 1000 MG: 900 INJECTION, SOLUTION INTRAVENOUS at 20:48

## 2021-07-20 RX ADMIN — CEFEPIME HYDROCHLORIDE 2 G: 2 INJECTION, SOLUTION INTRAVENOUS at 02:41

## 2021-07-20 RX ADMIN — CARBIDOPA AND LEVODOPA 1 TABLET: 25; 100 TABLET ORAL at 20:48

## 2021-07-20 RX ADMIN — SODIUM CHLORIDE 1000 MG: 900 INJECTION, SOLUTION INTRAVENOUS at 09:47

## 2021-07-20 RX ADMIN — CARBIDOPA AND LEVODOPA 1 TABLET: 25; 100 TABLET ORAL at 09:43

## 2021-07-20 RX ADMIN — ENOXAPARIN SODIUM 40 MG: 40 INJECTION SUBCUTANEOUS at 20:48

## 2021-07-20 RX ADMIN — INSULIN ASPART 7 UNITS: 100 INJECTION, SOLUTION INTRAVENOUS; SUBCUTANEOUS at 09:46

## 2021-07-20 RX ADMIN — INSULIN ASPART 6 UNITS: 100 INJECTION, SOLUTION INTRAVENOUS; SUBCUTANEOUS at 17:37

## 2021-07-20 RX ADMIN — DIVALPROEX SODIUM 125 MG: 125 CAPSULE, COATED PELLETS ORAL at 09:46

## 2021-07-20 RX ADMIN — VITAM B12 50 MCG: 100 TAB at 09:43

## 2021-07-20 RX ADMIN — CARBIDOPA AND LEVODOPA 1 TABLET: 25; 100 TABLET ORAL at 02:42

## 2021-07-20 RX ADMIN — CEFEPIME HYDROCHLORIDE 2 G: 2 INJECTION, SOLUTION INTRAVENOUS at 17:43

## 2021-07-20 RX ADMIN — DIVALPROEX SODIUM 125 MG: 125 CAPSULE, COATED PELLETS ORAL at 20:48

## 2021-07-20 RX ADMIN — QUETIAPINE FUMARATE 25 MG: 25 TABLET ORAL at 20:48

## 2021-07-20 RX ADMIN — ACETAMINOPHEN 500 MG: 500 TABLET, FILM COATED ORAL at 02:42

## 2021-07-20 RX ADMIN — CARBIDOPA AND LEVODOPA 1 TABLET: 25; 100 TABLET ORAL at 17:37

## 2021-07-20 NOTE — CASE MANAGEMENT/SOCIAL WORK
Discharge Planning Assessment   Natty Escobar     Patient Name: Cory Elizalde  MRN: [id number]  Today's Date: 7/20/2021    Admit Date: 7/19/2021    Discharge Needs Assessment     Row Name 07/20/21 1532       Living Environment    Lives With  other (see comments)    Current Living Arrangements  residential facility    Duration at Residence  From [facility]    Provides Primary Care For  no one, unable/limited ability to care for self    Family Caregiver if Needed  other (see comments)    Quality of Family Relationships  supportive    Able to Return to Prior Arrangements  yes       Resource/Environmental Concerns    Resource/Environmental Concerns  none       Transition Planning    Patient/Family Anticipates Transition to  long-term care facility    Transportation Anticipated  health plan transportation       Discharge Needs Assessment    Readmission Within the Last 30 Days  no previous admission in last 30 days    Concerns to be Addressed  no discharge needs identified    Provided Post Acute Provider List?  N/A    Provided Post Acute Provider Quality & Resource List?  N/A        Discharge Plan     Row Name 07/20/21 2721       Plan    Plan  plan return to [facility] when stable    Patient/Family in Agreement with Plan  yes    Plan Comments  Spoke with patients son. Ko Elizalde, at bedside. He states his father has been at [facility] since July of last year. His father has Parkinsons dementia and is mostly nonverbal at baseline. Patient is lying in bed, tremors noted. Ko confirms plan is to return to [facility] when stable. CM will continue to follow to assist with return to facility. CM # placed on white board.        Continued Care and Services - Admitted Since 7/19/2021    Coordination has not been started for this encounter.         Demographic Summary     Row Name 07/20/21 1516       General Information    Referral Source  admission list    Reason for Consult  discharge planning    Preferred Language  English      Used During This Interaction  no       Contact Information    Permission Granted to Share Info With          Functional Status    No documentation.       Psychosocial    No documentation.       Abuse/Neglect    No documentation.       Legal    No documentation.       Substance Abuse    No documentation.       Patient Forms    No documentation.           Paul Barahona RN

## 2021-07-20 NOTE — PROGRESS NOTES
"[facility]  Clinical Pharmacy Department     Vancomycin Pharmacokinetic Note    Cory Elizalde is a 76 y.o. male who is on day 2 of pharmacy to dose vancomycin for sepsis.    Target level: AUC24 400-600  Consulting Provider:  Praveena Nj  Current Vancomycin Dose:   1,500 mg (23.6 mg/kg) IV every  24  hours  Planned Duration of Therapy: 3 days  Other Antimicrobials: cefepime    Allergies  Allergies as of 07/19/2021    (No Known Allergies)       Microbiology:  Microbiology Results (last 10 days)       Procedure Component Value - Date/Time    COVID-19 and FLU A/B PCR - Swab, Nasopharynx [[id number]]  (Normal) Collected: 07/19/21 1846    Lab Status: Final result Specimen: Swab from Nasopharynx Updated: 07/19/21 1926     COVID19 Not Detected     Influenza A PCR Not Detected     Influenza B PCR Not Detected    Narrative:      Fact sheet for providers: https://www.fda.gov/media/773042/download    Fact sheet for patients: https://www.fda.gov/media/501556/download    Test performed by PCR.                Vitals/Labs/I&O  182.9 cm (72\")  63 kg (139 lb)  Body mass index is 18.85 kg/m².   [unfilled]    Results from last 7 days   Lab Units 07/19/21  1830   WBC 10*3/mm3 4.36     Results from last 7 days   Lab Units 07/19/21  2133 07/19/21  1830   LACTATE mmol/L 1.5 4.1*      Results from last 7 days   Lab Units 07/19/21  1830   PROCALCITONIN ng/mL 0.35*                  Estimated Creatinine Clearance: 66.5 mL/min (by C-G formula based on SCr of 0.87 mg/dL).  Results from last 7 days   Lab Units 07/19/21  1830   BUN mg/dL 25*   CREATININE mg/dL 0.87     Intake & Output (last 3 days)       None            Vancomycin Dosing and Level History:  Last Dose Received in the ED/Outside Facility: 1,000mg  Is Patient on Dialysis or Renal Replacement: no  Inpatient Dosing History (date/time): 1,500 @ 20:00                  Assessment/Plan:    Assessment:  Bayesian analysis of the most recent level(s) using [employer] provides " "the following patient-specific pharmacokinetic parameters:   CL: 2.8 L/h   Vd: 53 L   T1/2: 13.5 h  If the predicted trough on this regimen is not within what was previously considered a \"target trough range\", the AUC24 (a stronger predictor of vancomycin efficacy) is predicted to achieve the accepted target of 400-600 mg*h/L. To best balance efficacy and toxicity, we will be targeting AUC24 in this patient rather than steady-state trough levels.     Initiating the regimen of 1,500 mg (23.6mg/kg) IV every 24 hours gives a predicted steady-state trough of 14.2 mg/L and AUC24 of 522 mg*h/L based upon population pharmacokinetics and this patient's specific parameters.    Recommendations/Plan:  -Initiate vancomycin 1,500 mg (23.6 mg/kg) IV every 24 hours   -Obtain vancomycin level on 7-20-21 prior to the 3rd dose or sooner if acute changes   -Continue to monitor SCr    Thank you for involving pharmacy in this patient's care. Please contact pharmacy with any questions or concerns.                           Ko Marte [facility]  Clinical Pharmacist  07/19/21 22:46 EDT    "

## 2021-07-20 NOTE — PROGRESS NOTES
"Hospitalist Team      Patient Care Team:  Provider, No Known as PCP - General      Chief Complaint: Follow-up Febrile Illness; Altered Sensorium    Subjective    No acute events overnight.  Mr. Elizalde has no complaints this morning.  He tells me he is not hungry.  He denies abdominal pain.    Objective    Vital Signs  Temp:  [98.6 °F (37 °C)-102.5 °F (39.2 °C)] 98.6 °F (37 °C)  Heart Rate:  [] 89  Resp:  [16-18] 18  BP: ()/(60-75) 113/60  Oxygen Therapy  SpO2: 99 %  Device (Oxygen Therapy): room air}    Flowsheet Rows      First Filed Value   Admission Height  182.9 cm (72\") Documented at 07/19/2021 1810   Admission Weight  63.5 kg (140 lb) Documented at 07/19/2021 1810        Physical Exam:    General: Appears stated age in no acute distress.  Lungs: Breath sounds are clear throughout all fields.  Respirations are non-labored.  CV: Regular rate and rhythm.  No murmur appreciated.  Radial pulses are 2+ and symmetric.  Abdomen: Soft and non-tender w/ active bowel sounds.  MSK: No C/C/E.  Skin: No suspicious rash.  Neuro: CN II-XII grossly intact.  Psych: Oriented only to self.    Results Review:     I reviewed the patient's new clinical results.    Lab Results (last 24 hours)     Procedure Component Value Units Date/Time    Respiratory Panel, PCR (WITHOUT COVID) - Swab, Nasopharynx [[id number]] Collected: 07/20/21 0826    Specimen: Swab from Nasopharynx Updated: 07/20/21 0830    MRSA Screen Culture (Outpatient) - Swab, Nares [[id number]] Collected: 07/20/21 0826    Specimen: Swab from Nares Updated: 07/20/21 0830    POC Glucose Once [[id number]]  (Abnormal) Collected: 07/20/21 0721    Specimen: Blood Updated: 07/20/21 0727     Glucose 331 mg/dL      Comment: Meter: [id number] : [id number] Eduardo Avitia CNA       Basic Metabolic Panel [[id number]]  (Abnormal) Collected: 07/20/21 0330    Specimen: Blood Updated: 07/20/21 0527     Glucose 343 mg/dL      BUN 20 mg/dL      Creatinine 0.69 mg/dL      Sodium " 140 mmol/L      Potassium 3.6 mmol/L      Chloride 105 mmol/L      CO2 22.1 mmol/L      Calcium 8.1 mg/dL      eGFR Non African Amer 112 mL/min/1.73      BUN/Creatinine Ratio 29.0     Anion Gap 12.9 mmol/L     Narrative:      GFR Normal >60  Chronic Kidney Disease <60  Kidney Failure <15      CBC & Differential [[id number]]  (Abnormal) Collected: 07/20/21 0330    Specimen: Blood Updated: 07/20/21 0447    Narrative:      The following orders were created for panel order CBC & Differential.  Procedure                               Abnormality         Status                     ---------                               -----------         ------                     CBC Auto Differential[[id number]]        Abnormal            Final result                 Please view results for these tests on the individual orders.    CBC Auto Differential [[id number]]  (Abnormal) Collected: 07/20/21 0330    Specimen: Blood Updated: 07/20/21 0447     WBC 3.32 10*3/mm3      RBC 3.65 10*6/mm3      Hemoglobin 11.3 g/dL      Hematocrit 36.2 %      MCV 99.2 fL      MCH 31.0 pg      MCHC 31.2 g/dL      RDW 13.7 %      RDW-SD 50.5 fl      MPV 9.9 fL      Platelets 168 10*3/mm3      Neutrophil % 84.1 %      Lymphocyte % 9.6 %      Monocyte % 5.4 %      Eosinophil % 0.0 %      Basophil % 0.6 %      Immature Grans % 0.3 %      Neutrophils, Absolute 2.79 10*3/mm3      Lymphocytes, Absolute 0.32 10*3/mm3      Monocytes, Absolute 0.18 10*3/mm3      Eosinophils, Absolute 0.00 10*3/mm3      Basophils, Absolute 0.02 10*3/mm3      Immature Grans, Absolute 0.01 10*3/mm3      nRBC 0.0 /100 WBC     POC Glucose Once [[id number]]  (Abnormal) Collected: 07/19/21 2228    Specimen: Blood Updated: 07/19/21 2235     Glucose 216 mg/dL      Comment: DAO Rodriguezied R Blayne KHAN Meter: [id number] : [id number] [facility]        STAT Lactic Acid, Reflex [[id number]]  (Normal) Collected: 07/19/21 2133    Specimen: Blood Updated: 07/19/21 2205     Lactate 1.5 mmol/L      COVID-19 and FLU A/B PCR - Swab, Nasopharynx [[id number]]  (Normal) Collected: 07/19/21 1846    Specimen: Swab from Nasopharynx Updated: 07/19/21 1926     COVID19 Not Detected     Influenza A PCR Not Detected     Influenza B PCR Not Detected    Narrative:      Fact sheet for providers: https://www.fda.gov/media/001979/download    Fact sheet for patients: https://www.fda.gov/media/668455/download    Test performed by PCR.    Procalcitonin [[id number]]  (Abnormal) Collected: 07/19/21 1830    Specimen: Blood Updated: 07/19/21 1918     Procalcitonin 0.35 ng/mL     Narrative:      Results may be falsely decreased if patient taking Biotin.     Urinalysis, Microscopic Only - Urine, Catheter [[id number]]  (Abnormal) Collected: 07/19/21 1831    Specimen: Urine, Catheter Updated: 07/19/21 1918     RBC, UA 31-50 /HPF      WBC, UA 3-5 /HPF      Bacteria, UA 1+ /HPF      Squamous Epithelial Cells, UA 3-6 /HPF      Hyaline Casts, UA None Seen /LPF      Methodology Manual Light Microscopy    Lactic Acid, Plasma [[id number]]  (Abnormal) Collected: 07/19/21 1830    Specimen: Blood Updated: 07/19/21 1908     Lactate 4.1 mmol/L     Comprehensive Metabolic Panel [[id number]]  (Abnormal) Collected: 07/19/21 1830    Specimen: Blood Updated: 07/19/21 1907     Glucose 326 mg/dL      BUN 25 mg/dL      Creatinine 0.87 mg/dL      Sodium 138 mmol/L      Potassium 4.3 mmol/L      Comment: Slight hemolysis detected by analyzer. Results may be affected.        Chloride 102 mmol/L      CO2 24.9 mmol/L      Calcium 9.6 mg/dL      Total Protein 6.4 g/dL      Albumin 3.80 g/dL      ALT (SGPT) 12 U/L      AST (SGOT) 33 U/L      Comment: Slight hemolysis detected by analyzer. Results may be affected.        Alkaline Phosphatase 69 U/L      Total Bilirubin 0.4 mg/dL      eGFR Non African Amer 86 mL/min/1.73      Globulin 2.6 gm/dL      A/G Ratio 1.5 g/dL      BUN/Creatinine Ratio 28.7     Anion Gap 11.1 mmol/L     Narrative:      GFR Normal >60  Chronic  Kidney Disease <60  Kidney Failure <15      Urinalysis With Culture If Indicated - Urine, Catheter [[id number]]  (Abnormal) Collected: 07/19/21 1831    Specimen: Urine, Catheter Updated: 07/19/21 1907     Color, UA Yellow     Appearance, UA Clear     pH, UA <=5.0     Specific Gravity, UA 1.020     Glucose, UA >=1000 mg/dL (3+)     Ketones, UA 40 mg/dL (2+)     Bilirubin, UA Negative     Blood, UA Large (3+)     Protein, UA Negative     Leuk Esterase, UA Negative     Nitrite, UA Negative     Urobilinogen, UA 0.2 E.U./dL    Protime-INR [[id number]]  (Normal) Collected: 07/19/21 1830    Specimen: Blood Updated: 07/19/21 1900     Protime 13.4 Seconds      INR 1.02    Narrative:      Therapeutic Ranges for INR: 2.0-3.0 (PT 20-30)                              2.5-3.5 (PT 25-34)    aPTT [[id number]]  (Normal) Collected: 07/19/21 1830    Specimen: Blood Updated: 07/19/21 1900     PTT 33.7 seconds     Narrative:      PTT = The equivalent PTT values for the therapeutic range of heparin levels at 0.1 to 0.7 U/ml are 53 to 110 seconds.      CBC & Differential [[id number]]  (Abnormal) Collected: 07/19/21 1830    Specimen: Blood Updated: 07/19/21 1854    Narrative:      The following orders were created for panel order CBC & Differential.  Procedure                               Abnormality         Status                     ---------                               -----------         ------                     CBC Auto Differential[[id number]]        Abnormal            Final result                 Please view results for these tests on the individual orders.    CBC Auto Differential [[id number]]  (Abnormal) Collected: 07/19/21 1830    Specimen: Blood Updated: 07/19/21 1854     WBC 4.36 10*3/mm3      RBC 4.20 10*6/mm3      Hemoglobin 13.2 g/dL      Hematocrit 40.2 %      MCV 95.7 fL      MCH 31.4 pg      MCHC 32.8 g/dL      RDW 13.8 %      RDW-SD 48.9 fl      MPV 10.1 fL      Platelets 230 10*3/mm3      Neutrophil % 87.1 %       Lymphocyte % 8.0 %      Monocyte % 3.9 %      Eosinophil % 0.0 %      Basophil % 0.5 %      Immature Grans % 0.5 %      Neutrophils, Absolute 3.80 10*3/mm3      Lymphocytes, Absolute 0.35 10*3/mm3      Monocytes, Absolute 0.17 10*3/mm3      Eosinophils, Absolute 0.00 10*3/mm3      Basophils, Absolute 0.02 10*3/mm3      Immature Grans, Absolute 0.02 10*3/mm3      nRBC 0.0 /100 WBC     Blood Culture - Blood, Arm, Left [[id number]] Collected: 07/19/21 1825    Specimen: Blood from Arm, Left Updated: 07/19/21 1849    Blood Culture - Blood, Arm, Right [[id number]] Collected: 07/19/21 1830    Specimen: Blood from Arm, Right Updated: 07/19/21 1849          Imaging Results (Last 24 Hours)     Procedure Component Value Units Date/Time    XR Chest 1 View [[id number]] Collected: 07/19/21 1828     Updated: 07/19/21 1830    Narrative:      CR Chest 1 Vw    INDICATION:   Altered mental status, fever     COMPARISON:    Chest x-ray from 7/4/2020    FINDINGS:  Single portable AP view(s) of the chest.  Apparent increased soft tissue density in the patient's right hilar and suprahilar region may be accentuated secondary to apical lordotic positioning and rotation.. The lungs are grossly clear. No pneumothorax or  pleural effusion.      Impression:      No definite acute cardiopulmonary findings. Apparent increased soft tissue density in the patient's right hilar and suprahilar region may be accentuated secondary to apical lordotic positioning and rotation.    Signer Name: Lisa Baig MD   Signed: 7/19/2021 6:28 PM   Workstation Name: [id number]    [facility]    CT Head Without Contrast Stroke Protocol [[id number]] Collected: 07/19/21 1820     Updated: 07/19/21 1822    Narrative:      CT Head WO Code Stroke: 7/19/2021 7:10 PM    HISTORY:   Altered mental status, fever    TECHNIQUE:   Axial unenhanced head CT. Radiation dose reduction techniques included automated exposure control or exposure modulation based on body  size. Radiation audit for number of CT and nuclear cardiology exams performed in the last year: 0.      COMPARISON:   CT of the head from 7/4/2020.    FINDINGS:   Exam is limited by motion artifact. No intracranial hemorrhage, mass, or definite acute infarct. Atrophic and chronic small vessel disease changes again noted. No hydrocephalus or extra-axial fluid collection. The skull base, calvarium, and extracranial  soft tissues are normal.      Impression:      No acute intracranial abnormality given limitation of motion artifact.      Signer Name: Lisa Baig MD   Signed: 7/19/2021 6:20 PM   Workstation Name: [id number]    [facility]          Xray reviewed personally by physician.      ECG reviewed personally by physician  ECG/EMG Results (most recent)     None          Medication Review:   I have reviewed the patient's current medication list    Current Facility-Administered Medications:   •  acetaminophen (TYLENOL) tablet 500 mg, 500 mg, Oral, Q6H PRN, Praveena Monteiro MD, 500 mg at 07/20/21 0242  •  artificial tears (REFRESH LACRI-LUBE) ophthalmic ointment, , Both Eyes, Nightly PRN, Praveena Monteiro MD  •  bisacodyl (DULCOLAX) suppository 10 mg, 10 mg, Rectal, Daily PRN, Praveena Monteiro MD  •  carbidopa-levodopa (SINEMET)  MG per tablet 1 tablet, 1 tablet, Oral, 4x Daily, Praveena Monteiro MD, 1 tablet at 07/20/21 0242  •  cefepime (MAXIPIME) IVPB 2 g/50ml D5W (premix), 2 g, Intravenous, Q8H, Praveena Monteiro MD, 2 g at 07/20/21 0241  •  dextrose (D50W) 25 g/ 50mL Intravenous Solution 25 g, 25 g, Intravenous, Q15 Min PRN, Praveena Monteiro MD  •  dextrose (GLUTOSE) oral gel 15 g, 15 g, Oral, Q15 Min PRN, Praveena Monteiro MD  •  Divalproex Sodium (DEPAKOTE SPRINKLE) capsule 125 mg, 125 mg, Oral, Q12H, Praveena Monteiro MD  •  enoxaparin (LOVENOX) syringe 40 mg, 40 mg, Subcutaneous, Q24H, Praveena Monteiro MD  •  entacapone (COMTAN) tablet 200 mg, 200 mg, Oral,  4x Daily, Praveena Monteiro MD  •  eucerin cream, , Topical, BID PRN, Praveena Monteiro MD, Given at 07/20/21 0244  •  glucagon (GLUCAGEN) injection 1 mg, 1 mg, Subcutaneous, Q15 Min PRN, Praveena Monteiro MD  •  insulin aspart (novoLOG) injection 0-9 Units, 0-9 Units, Subcutaneous, TID AC, Praveena Monteiro MD  •  mirtazapine (REMERON) tablet 15 mg, 15 mg, Oral, Nightly, Praveena Monteiro MD  •  nitroglycerin (NITROSTAT) SL tablet 0.4 mg, 0.4 mg, Sublingual, Q5 Min PRN, Praveena Monteiro MD  •  Pharmacy to dose vancomycin, , Does not apply, Continuous PRN, Praveena Monteiro MD  •  QUEtiapine (SEROquel) tablet 25 mg, 25 mg, Oral, Nightly, Praveena Monteiro MD  •  sodium chloride 0.9 % flush 10 mL, 10 mL, Intravenous, PRN, Praveena Monteiro MD  •  sodium chloride 0.9 % flush 10 mL, 10 mL, Intravenous, Q12H, Praveena Monteiro MD, 10 mL at 07/19/21 2200  •  sodium chloride 0.9 % infusion 40 mL, 40 mL, Intravenous, PRN, Praveena Monteiro MD  •  vancomycin (VANCOCIN) 1,000 mg in sodium chloride 0.9 % 250 mL IVPB, 1,000 mg, Intravenous, Q12H, Praveena Monteiro MD  •  vitamin B-12 (CYANOCOBALAMIN) tablet 50 mcg, 50 mcg, Oral, Daily, rPaveena Monteiro MD      Assessment/Plan     1.  Febrile Illness: Does not meet criteria for sepsis as there is no evidence of systemic dysfunction.  Etiology is unclear.  I added a viral panel which was negative.  His lungs remain clear and he is free of supplemental O2.  UA is unremarkable.  PCT is low, but in the treatment range.  Not sure what I'm treating.    2.  Altered Mental Status: I'm presuming he is at his baseline because he was appropriate on my exam although he only could tell me his name.  ER notes reviewed, and apparently he was non-verbal in the ER so clearly better.    3.  Diabetes Mellitus, Type 2: A1c near goal at 8.2%.  I resumed his Metformin given his PO intake.  Continue to monitor.    4.  Lactic acidosis: Resolved.  Watch w/ resumption of  Metformin.    5.  Parkinson's complicated by Dementia: As discussed.  He continued on his home regimen.    Plan for disposition: Likely AD in AM.    Genaro Mendez MD  07/20/21  09:28 EDT

## 2021-07-20 NOTE — H&P
"[facility] HOSPITALIST     Provider, No Known    CHIEF COMPLAINT: AMS    HISTORY OF PRESENT ILLNESS:    76 year old male with hx of T2DM,, Parkinson's Disease with dementia presents from the nursing home due to altered mental status.  Patient is a poor historian at baseline due to Parkinson's dementia and history taken from chart review.  Per nursing home the patient was less alert than usual today.  Upon my interview the patient is nonverbal at baseline.  He does not follow commands.  No nursing home staff to be able to provide any other history.      Past Medical History:   Diagnosis Date   • Alzheimer disease (CMS/HCC)    • Cognitive communication disorder    • Constipation    • COVID-19    • Diabetes mellitus (CMS/HCC)    • Falls    • Insomnia    • Major depressive disorder    • Osteoporosis    • Parkinson disease (CMS/HCC)    • Pyridoxine deficiency    • Vascular dementia with behavior disturbance (CMS/HCC)    • Weakness      History reviewed. No pertinent surgical history.  History reviewed. No pertinent family history.  Social History     Tobacco Use   • Smoking status: Never Smoker   Substance Use Topics   • Alcohol use: Yes     Comment: occasionally   • Drug use: Defer     (Not in a hospital admission)    Allergies:  Patient has no known allergies.    REVIEW OF SYSTEMS:    Unable to assess as patient is nonverbal and no other caregivers at bedside to provide any review of systems.    Vital Signs  Temp:  [99.9 °F (37.7 °C)-102.5 °F (39.2 °C)] 99.9 °F (37.7 °C)  Heart Rate:  [] 88  Resp:  [16-18] 16  BP: ()/(64-72) 112/64  Oxygen Therapy  SpO2: 95 %  Device (Oxygen Therapy): room air}  Body mass index is 18.99 kg/m².  Flowsheet Rows      First Filed Value   Admission Height  182.9 cm (72\") Documented at 07/19/2021 1810   Admission Weight  63.5 kg (140 lb) Documented at 07/19/2021 1810             Physical Exam:  Physical Exam   Constitutional: Chronically ill-appearing " nonverbal  HEENT:   Head: Normocephalic and atraumatic.   Eyes:  Pupils are equal, round, and reactive to light. EOM are intact. Sclerae are anicteric and noninjected.  Mouth and Throat: Dry mucous membranes. Oropharynx is clear of any erythema or exudate.     Neck: Neck supple. No JVD present. No thyromegaly present. No lymphadenopathy present.  Cardiovascular: Regular rate, regular rhythm, S1 normal and S2 normal.  Exam reveals no gallop and no friction rub.  No murmur heard.  Pulmonary/Chest: Lungs are clear to auscultation bilaterally. No respiratory distress. No wheezes. No rhonchi. No rales.   Abdominal: Soft. Bowel sounds are normal. No distension and no mass. There is no hepatosplenomegaly. There is no tenderness.   Musculoskeletal: Rigidity  Extremities: No edema. Pulses are palpable in all 4 extremities.  Neurological: Does not follow commands bilateral upper extremity tremor noted in hands  Skin: Skin is warm. No rash noted. Nails show no clubbing.  No cyanosis or erythema.    Results Review:    I reviewed the patient's new clinical results.  Lab Results (most recent)     Procedure Component Value Units Date/Time    COVID-19 and FLU A/B PCR - Swab, Nasopharynx [[id number]]  (Normal) Collected: 07/19/21 1846    Specimen: Swab from Nasopharynx Updated: 07/19/21 1926     COVID19 Not Detected     Influenza A PCR Not Detected     Influenza B PCR Not Detected    Narrative:      Fact sheet for providers: https://www.fda.gov/media/863943/download    Fact sheet for patients: https://www.fda.gov/media/517571/download    Test performed by PCR.    Procalcitonin [[id number]]  (Abnormal) Collected: 07/19/21 1830    Specimen: Blood Updated: 07/19/21 1918     Procalcitonin 0.35 ng/mL     Narrative:      Results may be falsely decreased if patient taking Biotin.     Urinalysis, Microscopic Only - Urine, Catheter [[id number]]  (Abnormal) Collected: 07/19/21 1831    Specimen: Urine, Catheter Updated: 07/19/21 1918     RBC, UA  31-50 /HPF      WBC, UA 3-5 /HPF      Bacteria, UA 1+ /HPF      Squamous Epithelial Cells, UA 3-6 /HPF      Hyaline Casts, UA None Seen /LPF      Methodology Manual Light Microscopy    Lactic Acid, Plasma [[id number]]  (Abnormal) Collected: 07/19/21 1830    Specimen: Blood Updated: 07/19/21 1908     Lactate 4.1 mmol/L     Comprehensive Metabolic Panel [[id number]]  (Abnormal) Collected: 07/19/21 1830    Specimen: Blood Updated: 07/19/21 1907     Glucose 326 mg/dL      BUN 25 mg/dL      Creatinine 0.87 mg/dL      Sodium 138 mmol/L      Potassium 4.3 mmol/L      Comment: Slight hemolysis detected by analyzer. Results may be affected.        Chloride 102 mmol/L      CO2 24.9 mmol/L      Calcium 9.6 mg/dL      Total Protein 6.4 g/dL      Albumin 3.80 g/dL      ALT (SGPT) 12 U/L      AST (SGOT) 33 U/L      Comment: Slight hemolysis detected by analyzer. Results may be affected.        Alkaline Phosphatase 69 U/L      Total Bilirubin 0.4 mg/dL      eGFR Non African Amer 86 mL/min/1.73      Globulin 2.6 gm/dL      A/G Ratio 1.5 g/dL      BUN/Creatinine Ratio 28.7     Anion Gap 11.1 mmol/L     Narrative:      GFR Normal >60  Chronic Kidney Disease <60  Kidney Failure <15      Urinalysis With Culture If Indicated - Urine, Catheter [[id number]]  (Abnormal) Collected: 07/19/21 1831    Specimen: Urine, Catheter Updated: 07/19/21 1907     Color, UA Yellow     Appearance, UA Clear     pH, UA <=5.0     Specific Gravity, UA 1.020     Glucose, UA >=1000 mg/dL (3+)     Ketones, UA 40 mg/dL (2+)     Bilirubin, UA Negative     Blood, UA Large (3+)     Protein, UA Negative     Leuk Esterase, UA Negative     Nitrite, UA Negative     Urobilinogen, UA 0.2 E.U./dL    Protime-INR [[id number]]  (Normal) Collected: 07/19/21 1830    Specimen: Blood Updated: 07/19/21 1900     Protime 13.4 Seconds      INR 1.02    Narrative:      Therapeutic Ranges for INR: 2.0-3.0 (PT 20-30)                              2.5-3.5 (PT 25-34)    aPTT [[id number]]   (Normal) Collected: 07/19/21 1830    Specimen: Blood Updated: 07/19/21 1900     PTT 33.7 seconds     Narrative:      PTT = The equivalent PTT values for the therapeutic range of heparin levels at 0.1 to 0.7 U/ml are 53 to 110 seconds.      CBC & Differential [[id number]]  (Abnormal) Collected: 07/19/21 1830    Specimen: Blood Updated: 07/19/21 1854    Narrative:      The following orders were created for panel order CBC & Differential.  Procedure                               Abnormality         Status                     ---------                               -----------         ------                     CBC Auto Differential[[id number]]        Abnormal            Final result                 Please view results for these tests on the individual orders.    CBC Auto Differential [[id number]]  (Abnormal) Collected: 07/19/21 1830    Specimen: Blood Updated: 07/19/21 1854     WBC 4.36 10*3/mm3      RBC 4.20 10*6/mm3      Hemoglobin 13.2 g/dL      Hematocrit 40.2 %      MCV 95.7 fL      MCH 31.4 pg      MCHC 32.8 g/dL      RDW 13.8 %      RDW-SD 48.9 fl      MPV 10.1 fL      Platelets 230 10*3/mm3      Neutrophil % 87.1 %      Lymphocyte % 8.0 %      Monocyte % 3.9 %      Eosinophil % 0.0 %      Basophil % 0.5 %      Immature Grans % 0.5 %      Neutrophils, Absolute 3.80 10*3/mm3      Lymphocytes, Absolute 0.35 10*3/mm3      Monocytes, Absolute 0.17 10*3/mm3      Eosinophils, Absolute 0.00 10*3/mm3      Basophils, Absolute 0.02 10*3/mm3      Immature Grans, Absolute 0.02 10*3/mm3      nRBC 0.0 /100 WBC     Blood Culture - Blood, Arm, Left [[id number]] Collected: 07/19/21 1825    Specimen: Blood from Arm, Left Updated: 07/19/21 1849    Blood Culture - Blood, Arm, Right [[id number]] Collected: 07/19/21 1830    Specimen: Blood from Arm, Right Updated: 07/19/21 1849          Imaging Results (Most Recent)     Procedure Component Value Units Date/Time    XR Chest 1 View [[id number]] Collected: 07/19/21 1828     Updated: 07/19/21  1830    Narrative:      CR Chest 1 Vw    INDICATION:   Altered mental status, fever     COMPARISON:    Chest x-ray from 7/4/2020    FINDINGS:  Single portable AP view(s) of the chest.  Apparent increased soft tissue density in the patient's right hilar and suprahilar region may be accentuated secondary to apical lordotic positioning and rotation.. The lungs are grossly clear. No pneumothorax or  pleural effusion.      Impression:      No definite acute cardiopulmonary findings. Apparent increased soft tissue density in the patient's right hilar and suprahilar region may be accentuated secondary to apical lordotic positioning and rotation.    Signer Name: Lisa Baig MD   Signed: 7/19/2021 6:28 PM   Workstation Name: [id number]    [facility]    CT Head Without Contrast Stroke Protocol [[id number]] Collected: 07/19/21 1820     Updated: 07/19/21 1822    Narrative:      CT Head WO Code Stroke: 7/19/2021 7:10 PM    HISTORY:   Altered mental status, fever    TECHNIQUE:   Axial unenhanced head CT. Radiation dose reduction techniques included automated exposure control or exposure modulation based on body size. Radiation audit for number of CT and nuclear cardiology exams performed in the last year: 0.      COMPARISON:   CT of the head from 7/4/2020.    FINDINGS:   Exam is limited by motion artifact. No intracranial hemorrhage, mass, or definite acute infarct. Atrophic and chronic small vessel disease changes again noted. No hydrocephalus or extra-axial fluid collection. The skull base, calvarium, and extracranial  soft tissues are normal.      Impression:      No acute intracranial abnormality given limitation of motion artifact.      Signer Name: Lisa Baig MD   Signed: 7/19/2021 6:20 PM   Workstation Name: [id number]    [facility]        reviewed    ECG/EMG Results (most recent)     None        reviewed    Assessment/Plan     Sepsis secondary to unknown etiology possibly  secondary to UTI 1+ bacteria in urine  -We will start broad-spectrum antibiotics Vancomycin and cefepime  -Panculture: Urine Culture and Blood cultures x 2  - Repeat Lactic Acid, initial 4.1  - Procalcitionin elevated 0.35  - IVF 30 cc/kg  - repeat labs in the AM    Parkinson's disease  -Resume home Sinemet, Depakote, Entacapone, Seroquel    Type 2 Diabetes Mellitus  - ISS medium dose, with AC and QHS checks  - hypoglycemia protocol in place    Vitamin B12 deficiency  - continue B12 supplements    DVT ppx: Lovenox  PPI: NI  Code: Full      Praveena Monteiro MD  07/19/21  21:08 EDT

## 2021-07-20 NOTE — PLAN OF CARE
Goal Outcome Evaluation:  Plan of Care Reviewed With: patient        Progress: no change  Outcome Summary: Pt resting since admission; verified last med administration. elevated temp given tylenol. Parkinsons disease, male purwick tolerated well.

## 2021-07-20 NOTE — PLAN OF CARE
Problem: Nutrition Impaired (Sepsis/Septic Shock)  Goal: Optimal Nutrition Intake  Outcome: Ongoing, Not Progressing     Problem: Diabetes Comorbidity  Goal: Blood Glucose Level Within Desired Range  Outcome: Ongoing, Progressing     Problem: Skin Injury Risk Increased  Goal: Skin Health and Integrity  Outcome: Ongoing, Progressing     Problem: Adjustment to Illness (Sepsis/Septic Shock)  Goal: Optimal Coping  Outcome: Ongoing, Progressing     Problem: Bleeding (Sepsis/Septic Shock)  Goal: Absence of Bleeding  Outcome: Ongoing, Progressing     Problem: Glycemic Control Impaired (Sepsis/Septic Shock)  Goal: Blood Glucose Level Within Desired Range  Outcome: Ongoing, Progressing     Problem: Hemodynamic Instability (Sepsis/Septic Shock)  Goal: Effective Tissue Perfusion  Outcome: Ongoing, Progressing     Problem: Infection (Sepsis/Septic Shock)  Goal: Absence of Infection Signs and Symptoms  Outcome: Ongoing, Progressing     Problem: Respiratory Compromise (Sepsis/Septic Shock)  Goal: Effective Oxygenation and Ventilation  Outcome: Ongoing, Progressing   Goal Outcome Evaluation:

## 2021-07-20 NOTE — PROGRESS NOTES
Adult Nutrition  Assessment/PES    Patient Name:  Cory Elizalde  YOB: 1945  MRN: [id number]  Admit Date:  7/19/2021    Assessment Date:  7/20/2021    Recommend: Add Boost Plus with meals to supplement po intake   Pt unable to give permission for nutrition physical exam at this time.   Will cont to follow and monitor.     Reason for Assessment     Row Name 07/20/21 1129          Reason for Assessment    Reason For Assessment  identified at risk by screening criteria age, BMI     Diagnosis  neurologic conditions Change in [place] from nursing facility hx Parkinson, Dementia, DM     Identified At Risk by Screening Criteria  MST SCORE 2+         Nutrition/Diet History     Row Name 07/20/21 1130          Nutrition/Diet History    Typical Food/Fluid Intake  Pt responds yes when enters then seems to grab at blanket but keeps eyes clothes, says he doesn't know when asked about breakfast. Pt from [facility] per rounds.         Anthropometrics     Row Name 07/20/21 1133          Anthropometrics    Weight  -- 139#        Usual Body Weight (UBW)    Weight Loss Time Frame  per EMR 7/2020 111.3#that is a gain of 27.7# over past yr if these are correct numbers        Body Mass Index (BMI)    BMI Assessment  BMI 18.5-24.9: normal         Labs/Tests/Procedures/Meds     Row Name 07/20/21 1135          Labs/Procedures/Meds    Lab Results Reviewed  reviewed        Diagnostic Tests/Procedures    Diagnostic Test/Procedure Reviewed  reviewed        Medications    Pertinent Medications Reviewed  reviewed           Estimated/Assessed Needs     Row Name 07/20/21 1136          Estimated/Assessed Needs    Additional Documentation  Calorie Requirements (Group);Fluid Requirements (Group);Protein Requirements (Group)        Calorie Requirements    Estimated Calorie Need Method  Vikki-St Evangelista     Estimated Calorie Requirement Comment  8496-4133 kcal ( mifflin 1.2-1.4)        Protein Requirements    Est Protein Requirement Amount  (gms/kg)  1.0 gm protein 63-76 gm pro (1-1.2 gm/kg pro)        Fluid Requirements    Estimated Fluid Requirement Method  RDA Method 5731-0884 ml         Nutrition Prescription Ordered     Row Name 07/20/21 1143          Nutrition Prescription PO    Common Modifiers  Cardiac;Consistent Carbohydrate         Evaluation of Received Nutrient/Fluid Intake     Row Name 07/20/21 1143          Fluid Intake Evaluation    Oral Fluid (mL)  -- insufficient data        PO Evaluation    Number of Days PO Intake Evaluated  Insufficient Data               Problem/Interventions:  Problem 1     Row Name 07/20/21 1144          Nutrition Diagnoses Problem 1    Problem 1  Underweight     Etiology (related to)  Factors Affecting Nutrition;Functional Diagnosis     Functional Diagnosis  Cognitive deficit     Signs/Symptoms (evidenced by)  Report/Observation;BMI     BMI  18 - 18.9               Intervention Goal     Row Name 07/20/21 1145          Intervention Goal    General  Meet nutritional needs for age/condition     PO  Establish PO;PO intake (%)     PO Intake %  50 % or greater     Weight  Maintain weight         Nutrition Intervention     Row Name 07/20/21 1147          Nutrition Intervention    RD/Tech Action  Follow Tx progress           Education/Evaluation     Row Name 07/20/21 1147          Education    Education  Education not appropriate at this time        Monitor/Evaluation    Monitor  Per protocol;I&O;PO intake;Supplement intake;Pertinent labs;Weight           Electronically signed by:  Justina Willson RD  07/20/21 11:48 EDT

## 2021-07-20 NOTE — CASE MANAGEMENT/SOCIAL WORK
Continued Stay Note  EUGENIO Benitez     Patient Name: Cory Elizalde  MRN: [id number]  Today's Date: 7/20/2021    Admit Date: 7/19/2021    Discharge Plan     Row Name 07/20/21 1047       Plan    Plan  plan return to [place]    Plan Comments  Spoke with [facility] who states they can accept patient back when stable. She will notify patients son, Ko Elizalde of admission. CM will follow for dc needs.        Discharge Codes    No documentation.             Paul Barahona RN

## 2021-07-21 ENCOUNTER — APPOINTMENT (OUTPATIENT)
Dept: GENERAL RADIOLOGY | Facility: HOSPITAL | Age: 76
End: 2021-07-21

## 2021-07-21 LAB
ALBUMIN SERPL-MCNC: 3.3 G/DL (ref 3.5–5.2)
ALBUMIN/GLOB SERPL: 1.4 G/DL
ALP SERPL-CCNC: 49 U/L (ref 39–117)
ALT SERPL W P-5'-P-CCNC: 14 U/L (ref 1–41)
ANION GAP SERPL CALCULATED.3IONS-SCNC: 7.5 MMOL/L (ref 5–15)
AST SERPL-CCNC: 25 U/L (ref 1–40)
BASOPHILS # BLD AUTO: 0.02 10*3/MM3 (ref 0–0.2)
BASOPHILS NFR BLD AUTO: 0.8 % (ref 0–1.5)
BILIRUB SERPL-MCNC: 0.4 MG/DL (ref 0–1.2)
BUN SERPL-MCNC: 19 MG/DL (ref 8–23)
BUN/CREAT SERPL: 27.1 (ref 7–25)
CALCIUM SPEC-SCNC: 8.2 MG/DL (ref 8.6–10.5)
CHLORIDE SERPL-SCNC: 100 MMOL/L (ref 98–107)
CO2 SERPL-SCNC: 25.5 MMOL/L (ref 22–29)
CREAT SERPL-MCNC: 0.7 MG/DL (ref 0.76–1.27)
DEPRECATED RDW RBC AUTO: 49.1 FL (ref 37–54)
EOSINOPHIL # BLD AUTO: 0.01 10*3/MM3 (ref 0–0.4)
EOSINOPHIL NFR BLD AUTO: 0.4 % (ref 0.3–6.2)
ERYTHROCYTE [DISTWIDTH] IN BLOOD BY AUTOMATED COUNT: 13.6 % (ref 12.3–15.4)
GFR SERPL CREATININE-BSD FRML MDRD: 110 ML/MIN/1.73
GLOBULIN UR ELPH-MCNC: 2.4 GM/DL
GLUCOSE BLDC GLUCOMTR-MCNC: 108 MG/DL (ref 70–130)
GLUCOSE BLDC GLUCOMTR-MCNC: 221 MG/DL (ref 70–130)
GLUCOSE BLDC GLUCOMTR-MCNC: 308 MG/DL (ref 70–130)
GLUCOSE BLDC GLUCOMTR-MCNC: 310 MG/DL (ref 70–130)
GLUCOSE SERPL-MCNC: 298 MG/DL (ref 65–99)
HCT VFR BLD AUTO: 35.5 % (ref 37.5–51)
HGB BLD-MCNC: 11.7 G/DL (ref 13–17.7)
IMM GRANULOCYTES # BLD AUTO: 0.01 10*3/MM3 (ref 0–0.05)
IMM GRANULOCYTES NFR BLD AUTO: 0.4 % (ref 0–0.5)
LYMPHOCYTES # BLD AUTO: 0.68 10*3/MM3 (ref 0.7–3.1)
LYMPHOCYTES NFR BLD AUTO: 27 % (ref 19.6–45.3)
MCH RBC QN AUTO: 32 PG (ref 26.6–33)
MCHC RBC AUTO-ENTMCNC: 33 G/DL (ref 31.5–35.7)
MCV RBC AUTO: 97 FL (ref 79–97)
MONOCYTES # BLD AUTO: 0.26 10*3/MM3 (ref 0.1–0.9)
MONOCYTES NFR BLD AUTO: 10.3 % (ref 5–12)
MRSA SPEC QL CULT: NORMAL
NEUTROPHILS NFR BLD AUTO: 1.54 10*3/MM3 (ref 1.7–7)
NEUTROPHILS NFR BLD AUTO: 61.1 % (ref 42.7–76)
PLATELET # BLD AUTO: 150 10*3/MM3 (ref 140–450)
PMV BLD AUTO: 9.9 FL (ref 6–12)
POTASSIUM SERPL-SCNC: 3.5 MMOL/L (ref 3.5–5.2)
PROCALCITONIN SERPL-MCNC: 0.39 NG/ML (ref 0–0.25)
PROT SERPL-MCNC: 5.7 G/DL (ref 6–8.5)
RBC # BLD AUTO: 3.66 10*6/MM3 (ref 4.14–5.8)
SODIUM SERPL-SCNC: 133 MMOL/L (ref 136–145)
VANCOMYCIN SERPL-MCNC: 11.1 MCG/ML (ref 5–40)
WBC # BLD AUTO: 2.52 10*3/MM3 (ref 3.4–10.8)

## 2021-07-21 PROCEDURE — 25010000002 CEFEPIME-DEXTROSE 2-5 GM-%(50ML) RECONSTITUTED SOLUTION: Performed by: STUDENT IN AN ORGANIZED HEALTH CARE EDUCATION/TRAINING PROGRAM

## 2021-07-21 PROCEDURE — 25010000002 ENOXAPARIN PER 10 MG: Performed by: STUDENT IN AN ORGANIZED HEALTH CARE EDUCATION/TRAINING PROGRAM

## 2021-07-21 PROCEDURE — 63710000001 INSULIN ASPART PER 5 UNITS: Performed by: STUDENT IN AN ORGANIZED HEALTH CARE EDUCATION/TRAINING PROGRAM

## 2021-07-21 PROCEDURE — 85025 COMPLETE CBC W/AUTO DIFF WBC: CPT | Performed by: HOSPITALIST

## 2021-07-21 PROCEDURE — 25010000002 VANCOMYCIN 1 G RECONSTITUTED SOLUTION 1 EACH VIAL: Performed by: STUDENT IN AN ORGANIZED HEALTH CARE EDUCATION/TRAINING PROGRAM

## 2021-07-21 PROCEDURE — 71045 X-RAY EXAM CHEST 1 VIEW: CPT

## 2021-07-21 PROCEDURE — 82962 GLUCOSE BLOOD TEST: CPT

## 2021-07-21 PROCEDURE — 99232 SBSQ HOSP IP/OBS MODERATE 35: CPT | Performed by: HOSPITALIST

## 2021-07-21 PROCEDURE — 84145 PROCALCITONIN (PCT): CPT | Performed by: HOSPITALIST

## 2021-07-21 PROCEDURE — 80202 ASSAY OF VANCOMYCIN: CPT | Performed by: STUDENT IN AN ORGANIZED HEALTH CARE EDUCATION/TRAINING PROGRAM

## 2021-07-21 PROCEDURE — 80053 COMPREHEN METABOLIC PANEL: CPT | Performed by: HOSPITALIST

## 2021-07-21 RX ADMIN — ENTACAPONE 200 MG: 200 TABLET, FILM COATED ORAL at 13:18

## 2021-07-21 RX ADMIN — CEFEPIME HYDROCHLORIDE 2 G: 2 INJECTION, SOLUTION INTRAVENOUS at 10:58

## 2021-07-21 RX ADMIN — SODIUM CHLORIDE, PRESERVATIVE FREE 10 ML: 5 INJECTION INTRAVENOUS at 09:59

## 2021-07-21 RX ADMIN — CARBIDOPA AND LEVODOPA 1 TABLET: 25; 100 TABLET ORAL at 21:52

## 2021-07-21 RX ADMIN — VITAM B12 50 MCG: 100 TAB at 09:57

## 2021-07-21 RX ADMIN — CARBIDOPA AND LEVODOPA 1 TABLET: 25; 100 TABLET ORAL at 18:01

## 2021-07-21 RX ADMIN — CARBIDOPA AND LEVODOPA 1 TABLET: 25; 100 TABLET ORAL at 13:18

## 2021-07-21 RX ADMIN — CEFEPIME HYDROCHLORIDE 2 G: 2 INJECTION, SOLUTION INTRAVENOUS at 18:01

## 2021-07-21 RX ADMIN — DIVALPROEX SODIUM 125 MG: 125 CAPSULE, COATED PELLETS ORAL at 09:57

## 2021-07-21 RX ADMIN — CARBIDOPA AND LEVODOPA 1 TABLET: 25; 100 TABLET ORAL at 09:57

## 2021-07-21 RX ADMIN — ENOXAPARIN SODIUM 40 MG: 40 INJECTION SUBCUTANEOUS at 21:51

## 2021-07-21 RX ADMIN — DIVALPROEX SODIUM 125 MG: 125 CAPSULE, COATED PELLETS ORAL at 21:52

## 2021-07-21 RX ADMIN — ENTACAPONE 200 MG: 200 TABLET, FILM COATED ORAL at 21:52

## 2021-07-21 RX ADMIN — SODIUM CHLORIDE 1000 MG: 900 INJECTION, SOLUTION INTRAVENOUS at 09:58

## 2021-07-21 RX ADMIN — METFORMIN HYDROCHLORIDE 500 MG: 500 TABLET ORAL at 18:01

## 2021-07-21 RX ADMIN — ENTACAPONE 200 MG: 200 TABLET, FILM COATED ORAL at 18:01

## 2021-07-21 RX ADMIN — SODIUM CHLORIDE, PRESERVATIVE FREE 10 ML: 5 INJECTION INTRAVENOUS at 20:00

## 2021-07-21 RX ADMIN — CEFEPIME HYDROCHLORIDE 2 G: 2 INJECTION, SOLUTION INTRAVENOUS at 01:49

## 2021-07-21 RX ADMIN — QUETIAPINE FUMARATE 25 MG: 25 TABLET ORAL at 21:52

## 2021-07-21 RX ADMIN — METFORMIN HYDROCHLORIDE 500 MG: 500 TABLET ORAL at 10:47

## 2021-07-21 RX ADMIN — ENTACAPONE 200 MG: 200 TABLET, FILM COATED ORAL at 10:47

## 2021-07-21 RX ADMIN — INSULIN ASPART 7 UNITS: 100 INJECTION, SOLUTION INTRAVENOUS; SUBCUTANEOUS at 13:18

## 2021-07-21 RX ADMIN — INSULIN ASPART 7 UNITS: 100 INJECTION, SOLUTION INTRAVENOUS; SUBCUTANEOUS at 09:58

## 2021-07-21 NOTE — PROGRESS NOTES
"Hospitalist Team      Patient Care Team:  Provider, No Known as PCP - General      Chief Complaint: Follow-up Febrile Illness    Subjective  No acute events overnight.  Staff report good PO intake at breakfast.      Objective    Vital Signs  Temp:  [98.4 °F (36.9 °C)-100.8 °F (38.2 °C)] 98.4 °F (36.9 °C)  Heart Rate:  [] 73  Resp:  [16-20] 17  BP: ()/(55-75) 97/55  Oxygen Therapy  SpO2: 97 %  Device (Oxygen Therapy): room air}    Flowsheet Rows      First Filed Value   Admission Height  182.9 cm (72\") Documented at 07/19/2021 1810   Admission Weight  63.5 kg (140 lb) Documented at 07/19/2021 1810          Physical Exam:    General: Appears stated age in no acute distress.  Lungs: Clear breath sounds throughout all fields.  Normal excursion.  CV: Regular rate and rhythm.  No murmur appreciated.  Abdomen: Soft and non-tender w/ active bowel sounds.  MSK: No C/C/E.  No asymmetry.  Neuro: CN II-XII grossly intact.  Psych: Drowsy, but aroused easily.  Ox2.    Results Review:     I reviewed the patient's new clinical results.    Lab Results (last 24 hours)     Procedure Component Value Units Date/Time    Vancomycin, Random [[id number]]  (Normal) Collected: 07/21/21 0730    Specimen: Blood Updated: 07/21/21 0814     Vancomycin Random 11.10 mcg/mL     Narrative:      Therapeutic Ranges for Vancomycin    Vancomycin Random   5.0-40.0 mcg/mL  Vancomycin Trough   5.0-20.0 mcg/mL  Vancomycin Peak     20.0-40.0 mcg/mL    Procalcitonin [[id number]]  (Abnormal) Collected: 07/21/21 0730    Specimen: Blood Updated: 07/21/21 0804     Procalcitonin 0.39 ng/mL     Narrative:      Results may be falsely decreased if patient taking Biotin.     Comprehensive Metabolic Panel [[id number]]  (Abnormal) Collected: 07/21/21 0730    Specimen: Blood Updated: 07/21/21 0802     Glucose 298 mg/dL      BUN 19 mg/dL      Creatinine 0.70 mg/dL      Sodium 133 mmol/L      Potassium 3.5 mmol/L      Chloride 100 mmol/L      CO2 25.5 mmol/L      " Calcium 8.2 mg/dL      Total Protein 5.7 g/dL      Albumin 3.30 g/dL      ALT (SGPT) 14 U/L      AST (SGOT) 25 U/L      Alkaline Phosphatase 49 U/L      Total Bilirubin 0.4 mg/dL      eGFR Non African Amer 110 mL/min/1.73      Globulin 2.4 gm/dL      A/G Ratio 1.4 g/dL      BUN/Creatinine Ratio 27.1     Anion Gap 7.5 mmol/L     Narrative:      GFR Normal >60  Chronic Kidney Disease <60  Kidney Failure <15      CBC & Differential [[id number]]  (Abnormal) Collected: 07/21/21 0730    Specimen: Blood Updated: 07/21/21 0745    Narrative:      The following orders were created for panel order CBC & Differential.  Procedure                               Abnormality         Status                     ---------                               -----------         ------                     CBC Auto Differential[[id number]]        Abnormal            Final result                 Please view results for these tests on the individual orders.    CBC Auto Differential [[id number]]  (Abnormal) Collected: 07/21/21 0730    Specimen: Blood Updated: 07/21/21 0745     WBC 2.52 10*3/mm3      RBC 3.66 10*6/mm3      Hemoglobin 11.7 g/dL      Hematocrit 35.5 %      MCV 97.0 fL      MCH 32.0 pg      MCHC 33.0 g/dL      RDW 13.6 %      RDW-SD 49.1 fl      MPV 9.9 fL      Platelets 150 10*3/mm3      Neutrophil % 61.1 %      Lymphocyte % 27.0 %      Monocyte % 10.3 %      Eosinophil % 0.4 %      Basophil % 0.8 %      Immature Grans % 0.4 %      Neutrophils, Absolute 1.54 10*3/mm3      Lymphocytes, Absolute 0.68 10*3/mm3      Monocytes, Absolute 0.26 10*3/mm3      Eosinophils, Absolute 0.01 10*3/mm3      Basophils, Absolute 0.02 10*3/mm3      Immature Grans, Absolute 0.01 10*3/mm3     POC Glucose Once [[id number]]  (Abnormal) Collected: 07/21/21 0725    Specimen: Blood Updated: 07/21/21 0731     Glucose 310 mg/dL      Comment: Meter: [id number] : [id number] Scot Noyola CNA       POC Glucose Once [[id number]]  (Abnormal) Collected: 07/20/21  2007    Specimen: Blood Updated: 07/20/21 2013     Glucose 286 mg/dL      Comment: Meter: [id number] : [id number] Frederick ANAYA       Blood Culture - Blood, Arm, Left [[id number]] Collected: 07/19/21 1825    Specimen: Blood from Arm, Left Updated: 07/20/21 1900     Blood Culture No growth at 24 hours    Blood Culture - Blood, Arm, Right [[id number]] Collected: 07/19/21 1830    Specimen: Blood from Arm, Right Updated: 07/20/21 1900     Blood Culture No growth at 24 hours    POC Glucose Once [[id number]]  (Abnormal) Collected: 07/20/21 1632    Specimen: Blood Updated: 07/20/21 1638     Glucose 260 mg/dL      Comment: Meter: [id number] : [id number] Eduardo Avitia CNA             Imaging Results (Last 24 Hours)     Procedure Component Value Units Date/Time    XR Chest 1 View [[id number]] Collected: 07/21/21 0741     Updated: 07/21/21 0753    Narrative:      CHEST X-RAY, 07/21/2021         HISTORY:  76-year-old male hospital inpatient with fever of unknown origin, mental  status change.     TECHNIQUE:  AP portable chest x-ray.     COMPARISON:  *  Chest x-ray, 7/19/2021 and 7/4/2020.     FINDINGS:  Shallow lung expansion with mild right basilar atelectasis, new since  the prior exam. The lungs are otherwise clear. No dense airspace  consolidation or pleural effusion.     Heart size and pulmonary vascularity are normal.     Middle mediastinal borders appear widened at the level of the ascending  thoracic aorta, new since the 2020 exam but unchanged since 07/19/2021.  Consider chest CT examination to assess for aortic dilatation or  mediastinal mass if clinically indicated.       Impression:      1.  Shallow lung expansion with new mild right basilar atelectasis.  Lungs otherwise clear.  2.  Radiographic widening of the right middle mediastinal border, new  when compared with older studies. While this could be artifact of AP  portable radiographic technique, chest CT examination may be helpful to  assess for  aortic dilatation or mediastinal mass if clinically  indicated.     This report was finalized on 7/21/2021 7:50 AM by Dr. Yogesh Carlin MD.             Medication Review:   I have reviewed the patient's current medication list    Current Facility-Administered Medications:   •  acetaminophen (TYLENOL) tablet 500 mg, 500 mg, Oral, Q6H PRN, Praveena Monteiro MD, 500 mg at 07/20/21 1541  •  artificial tears (REFRESH LACRI-LUBE) ophthalmic ointment, , Both Eyes, Nightly PRN, Praveena Monteiro MD  •  bisacodyl (DULCOLAX) suppository 10 mg, 10 mg, Rectal, Daily PRN, Praveena Monteiro MD  •  carbidopa-levodopa (SINEMET)  MG per tablet 1 tablet, 1 tablet, Oral, 4x Daily, Praveena Monteiro MD, 1 tablet at 07/20/21 2048  •  cefepime (MAXIPIME) IVPB 2 g/50ml D5W (premix), 2 g, Intravenous, Q8H, Praveena Monteiro MD, Stopped at 07/21/21 0626  •  dextrose (D50W) 25 g/ 50mL Intravenous Solution 25 g, 25 g, Intravenous, Q15 Min PRN, Praveena Monteiro MD  •  dextrose (GLUTOSE) oral gel 15 g, 15 g, Oral, Q15 Min PRN, Praveena Monteiro MD  •  Divalproex Sodium (DEPAKOTE SPRINKLE) capsule 125 mg, 125 mg, Oral, Q12H, Praveena Monteiro MD, 125 mg at 07/20/21 2048  •  enoxaparin (LOVENOX) syringe 40 mg, 40 mg, Subcutaneous, Q24H, Praveena Monteiro MD, 40 mg at 07/20/21 2048  •  entacapone (COMTAN) tablet 200 mg, 200 mg, Oral, 4x Daily, Praveena Monteiro MD  •  eucerin cream, , Topical, BID PRN, Praveena Monteiro MD, Given at 07/20/21 0244  •  glucagon (GLUCAGEN) injection 1 mg, 1 mg, Subcutaneous, Q15 Min PRN, Praveena Monteiro MD  •  insulin aspart (novoLOG) injection 0-9 Units, 0-9 Units, Subcutaneous, TID AC, Praveena Monteiro MD, 6 Units at 07/20/21 1737  •  metFORMIN (GLUCOPHAGE) tablet 500 mg, 500 mg, Oral, BID With Meals, Genaro Mendez MD  •  nitroglycerin (NITROSTAT) SL tablet 0.4 mg, 0.4 mg, Sublingual, Q5 Min PRN, Praveena Monteiro MD  •  Pharmacy to dose vancomycin, , Does not apply,  Continuous PRN, Praveena Monteiro MD  •  QUEtiapine (SEROquel) tablet 25 mg, 25 mg, Oral, Nightly, Praveena Monteiro MD, 25 mg at 07/20/21 2048  •  sodium chloride 0.9 % flush 10 mL, 10 mL, Intravenous, PRN, Praveena Monteiro MD  •  sodium chloride 0.9 % flush 10 mL, 10 mL, Intravenous, Q12H, Praveena Monteiro MD, 10 mL at 07/20/21 2048  •  sodium chloride 0.9 % infusion 40 mL, 40 mL, Intravenous, PRN, Praveena Monteiro MD  •  vancomycin (VANCOCIN) 1,000 mg in sodium chloride 0.9 % 250 mL IVPB, 1,000 mg, Intravenous, Q12H, Praveena Monteiro MD, 1,000 mg at 07/20/21 2048  •  vitamin B-12 (CYANOCOBALAMIN) tablet 50 mcg, 50 mcg, Oral, Daily, Praveena Monteiro MD, 50 mcg at 07/20/21 0943      Assessment/Plan     1.  Febrile Illness: Low grade temp yesterday afternoon, but none since.  MRSA screen negative so I've stopped the Vancomycin.  His hemogram consistent w/ viral illness, but viral work-up negative.  Procalcitonin relatively unchanged.  Culture data negative.  No evidence of aspiration per staff.  Etiology remains unclear.    2.  Diabetes Mellitus, Type 2: Bedsides and AM not at goal, but a little better.  Will follow trend for one more day on current regimen.    3.  Parkinson's Disease/Dementia: Appears at his baseline.  Continue current regimen.     Plan for disposition: Back to facility when able.    Genaro Mendez MD  07/21/21  09:56 EDT

## 2021-07-22 VITALS
SYSTOLIC BLOOD PRESSURE: 98 MMHG | HEIGHT: 72 IN | DIASTOLIC BLOOD PRESSURE: 62 MMHG | OXYGEN SATURATION: 99 % | HEART RATE: 59 BPM | RESPIRATION RATE: 18 BRPM | TEMPERATURE: 98.3 F | BODY MASS INDEX: 18.83 KG/M2 | WEIGHT: 139 LBS

## 2021-07-22 LAB
ALBUMIN SERPL-MCNC: 3 G/DL (ref 3.5–5.2)
ALBUMIN/GLOB SERPL: 1.3 G/DL
ALP SERPL-CCNC: 50 U/L (ref 39–117)
ALT SERPL W P-5'-P-CCNC: 10 U/L (ref 1–41)
ANION GAP SERPL CALCULATED.3IONS-SCNC: 7.2 MMOL/L (ref 5–15)
AST SERPL-CCNC: 29 U/L (ref 1–40)
BASOPHILS # BLD AUTO: 0.01 10*3/MM3 (ref 0–0.2)
BASOPHILS NFR BLD AUTO: 0.3 % (ref 0–1.5)
BILIRUB SERPL-MCNC: 0.4 MG/DL (ref 0–1.2)
BUN SERPL-MCNC: 18 MG/DL (ref 8–23)
BUN/CREAT SERPL: 25.7 (ref 7–25)
CALCIUM SPEC-SCNC: 8.4 MG/DL (ref 8.6–10.5)
CHLORIDE SERPL-SCNC: 99 MMOL/L (ref 98–107)
CO2 SERPL-SCNC: 28.8 MMOL/L (ref 22–29)
CREAT SERPL-MCNC: 0.7 MG/DL (ref 0.76–1.27)
DEPRECATED RDW RBC AUTO: 47.8 FL (ref 37–54)
EOSINOPHIL # BLD AUTO: 0.07 10*3/MM3 (ref 0–0.4)
EOSINOPHIL NFR BLD AUTO: 2.2 % (ref 0.3–6.2)
ERYTHROCYTE [DISTWIDTH] IN BLOOD BY AUTOMATED COUNT: 13.6 % (ref 12.3–15.4)
GFR SERPL CREATININE-BSD FRML MDRD: 110 ML/MIN/1.73
GLOBULIN UR ELPH-MCNC: 2.4 GM/DL
GLUCOSE BLDC GLUCOMTR-MCNC: 218 MG/DL (ref 70–130)
GLUCOSE BLDC GLUCOMTR-MCNC: 250 MG/DL (ref 70–130)
GLUCOSE SERPL-MCNC: 256 MG/DL (ref 65–99)
HCT VFR BLD AUTO: 37.3 % (ref 37.5–51)
HGB BLD-MCNC: 12.1 G/DL (ref 13–17.7)
IMM GRANULOCYTES # BLD AUTO: 0.01 10*3/MM3 (ref 0–0.05)
IMM GRANULOCYTES NFR BLD AUTO: 0.3 % (ref 0–0.5)
LYMPHOCYTES # BLD AUTO: 0.97 10*3/MM3 (ref 0.7–3.1)
LYMPHOCYTES NFR BLD AUTO: 30 % (ref 19.6–45.3)
MCH RBC QN AUTO: 30.9 PG (ref 26.6–33)
MCHC RBC AUTO-ENTMCNC: 32.4 G/DL (ref 31.5–35.7)
MCV RBC AUTO: 95.4 FL (ref 79–97)
MONOCYTES # BLD AUTO: 0.39 10*3/MM3 (ref 0.1–0.9)
MONOCYTES NFR BLD AUTO: 12.1 % (ref 5–12)
NEUTROPHILS NFR BLD AUTO: 1.78 10*3/MM3 (ref 1.7–7)
NEUTROPHILS NFR BLD AUTO: 55.1 % (ref 42.7–76)
NRBC BLD AUTO-RTO: 0 /100 WBC (ref 0–0.2)
PLAT MORPH BLD: NORMAL
PLATELET # BLD AUTO: 157 10*3/MM3 (ref 140–450)
PMV BLD AUTO: 9.9 FL (ref 6–12)
POTASSIUM SERPL-SCNC: 3.7 MMOL/L (ref 3.5–5.2)
PROCALCITONIN SERPL-MCNC: 0.33 NG/ML (ref 0–0.25)
PROT SERPL-MCNC: 5.4 G/DL (ref 6–8.5)
RBC # BLD AUTO: 3.91 10*6/MM3 (ref 4.14–5.8)
RBC MORPH BLD: NORMAL
SODIUM SERPL-SCNC: 135 MMOL/L (ref 136–145)
WBC # BLD AUTO: 3.23 10*3/MM3 (ref 3.4–10.8)
WBC MORPH BLD: NORMAL

## 2021-07-22 PROCEDURE — 80053 COMPREHEN METABOLIC PANEL: CPT | Performed by: HOSPITALIST

## 2021-07-22 PROCEDURE — 63710000001 INSULIN ASPART PER 5 UNITS: Performed by: STUDENT IN AN ORGANIZED HEALTH CARE EDUCATION/TRAINING PROGRAM

## 2021-07-22 PROCEDURE — 85025 COMPLETE CBC W/AUTO DIFF WBC: CPT | Performed by: HOSPITALIST

## 2021-07-22 PROCEDURE — 82962 GLUCOSE BLOOD TEST: CPT

## 2021-07-22 PROCEDURE — 85007 BL SMEAR W/DIFF WBC COUNT: CPT | Performed by: HOSPITALIST

## 2021-07-22 PROCEDURE — 84145 PROCALCITONIN (PCT): CPT | Performed by: HOSPITALIST

## 2021-07-22 PROCEDURE — 25010000002 CEFEPIME-DEXTROSE 2-5 GM-%(50ML) RECONSTITUTED SOLUTION: Performed by: STUDENT IN AN ORGANIZED HEALTH CARE EDUCATION/TRAINING PROGRAM

## 2021-07-22 PROCEDURE — 99238 HOSP IP/OBS DSCHRG MGMT 30/<: CPT | Performed by: HOSPITALIST

## 2021-07-22 RX ORDER — CEFDINIR 300 MG/1
300 CAPSULE ORAL 2 TIMES DAILY
Qty: 8 CAPSULE | Refills: 0
Start: 2021-07-22 | End: 2021-07-26

## 2021-07-22 RX ADMIN — CEFEPIME HYDROCHLORIDE 2 G: 2 INJECTION, SOLUTION INTRAVENOUS at 01:50

## 2021-07-22 RX ADMIN — INSULIN ASPART 4 UNITS: 100 INJECTION, SOLUTION INTRAVENOUS; SUBCUTANEOUS at 12:30

## 2021-07-22 RX ADMIN — CARBIDOPA AND LEVODOPA 1 TABLET: 25; 100 TABLET ORAL at 12:31

## 2021-07-22 RX ADMIN — ENTACAPONE 200 MG: 200 TABLET, FILM COATED ORAL at 12:31

## 2021-07-22 RX ADMIN — SODIUM CHLORIDE, PRESERVATIVE FREE 10 ML: 5 INJECTION INTRAVENOUS at 08:40

## 2021-07-22 RX ADMIN — CARBIDOPA AND LEVODOPA 1 TABLET: 25; 100 TABLET ORAL at 08:40

## 2021-07-22 RX ADMIN — METFORMIN HYDROCHLORIDE 500 MG: 500 TABLET ORAL at 08:40

## 2021-07-22 RX ADMIN — INSULIN ASPART 6 UNITS: 100 INJECTION, SOLUTION INTRAVENOUS; SUBCUTANEOUS at 08:40

## 2021-07-22 RX ADMIN — DIVALPROEX SODIUM 125 MG: 125 CAPSULE, COATED PELLETS ORAL at 08:40

## 2021-07-22 RX ADMIN — CEFEPIME HYDROCHLORIDE 2 G: 2 INJECTION, SOLUTION INTRAVENOUS at 10:33

## 2021-07-22 RX ADMIN — ENTACAPONE 200 MG: 200 TABLET, FILM COATED ORAL at 08:40

## 2021-07-22 RX ADMIN — VITAM B12 50 MCG: 100 TAB at 08:40

## 2021-07-22 NOTE — PLAN OF CARE
Goal Outcome Evaluation:  Plan of Care Reviewed With: patient        Progress: no change  Outcome Summary: Pt VSS,  labs see measures. continues tele, denies pain overnight. Pt resting in bed at this time.

## 2021-07-22 NOTE — PHARMACY RECOMMENDATION
"Pharmacy note:  Mr. Elizalde is a 76 year old male admitted 7/19/21.  Height 72\"; weight 63 kg; SCr 0.7 mg% (7/21); BUN 19 mg/dl (7/21).  Patient's current vancomycin dosage is 1 gram IV q 12 hours; trough drawn at 7:30 am 7/1 was 11.1 mcg/ml.  Discussed patient's condition and therapy with Dr. Mendez.  MRSA screen was negative and vancomycin is being discontinued at this time.  Yoly Alarcon, PharmD    "

## 2021-07-22 NOTE — PROGRESS NOTES
Adult Nutrition  Assessment/PES    Patient Name:  Cory Elizalde  YOB: 1945  MRN: [id number]  Admit Date:  7/19/2021    Assessment Date:  7/22/2021    Comments:  Encourage cont increase po intake.   Recommend: consider oral supplement such as Boost Glucose Control to provide an additional source of easily consumable nutrition.    Reason for Assessment     Row Name 07/22/21 1028          Reason for Assessment    Reason For Assessment  follow-up protocol     Diagnosis  neurologic conditions;infection/sepsis febrile illness from nursing facility hx of Parkinson, DM, dementia         Nutrition/Diet History     Row Name 07/22/21 1028          Nutrition/Diet History    Typical Food/Fluid Intake  pt resting at visit to room. per rounds plan for transfer back         Anthropometrics     Row Name 07/22/21 1029          Anthropometrics    Weight  -- no new wt         Labs/Tests/Procedures/Meds     Row Name 07/22/21 1029          Labs/Procedures/Meds    Lab Results Reviewed  reviewed     Lab Results Comments  glu 221        Diagnostic Tests/Procedures    Diagnostic Test/Procedure Reviewed  reviewed        Medications    Pertinent Medications Reviewed  reviewed     Pertinent Medications Comments  B12, glucophage, novolog             Nutrition Prescription Ordered     Row Name 07/22/21 1029          Nutrition Prescription PO    Current PO Diet  Pureed     Common Modifiers  Cardiac;Consistent Carbohydrate         Evaluation of Received Nutrient/Fluid Intake     Row Name 07/22/21 1030          Fluid Intake Evaluation    Oral Fluid (mL)  [address] x 2, 46%        PO Evaluation    Number of Meals  5     % PO Intake  55               Problem/Interventions:  Problem 1     Row Name 07/22/21 1030          Nutrition Diagnoses Problem 1    Problem 1  Underweight     Etiology (related to)  Factors Affecting Nutrition;Functional Diagnosis     Functional Diagnosis  Chewing deficit     Signs/Symptoms (evidenced by)   Report/Observation;BMI     BMI  18 - 18.9               Intervention Goal     Row Name 07/22/21 1031          Intervention Goal    General  Meet nutritional needs for age/condition     PO  Continue positive trend;PO intake (%)     PO Intake %  65 % or greater     Weight  Maintain weight         Nutrition Intervention     Row Name 07/22/21 1031          Nutrition Intervention    RD/Tech Action  Follow Tx progress           Education/Evaluation     Row Name 07/22/21 1031          Education    Education  Education not appropriate at this time;No discharge needs identified at this time        Monitor/Evaluation    Monitor  Per protocol;I&O;PO intake;Pertinent labs;Weight           Electronically signed by:  Justina Willson RD  07/22/21 10:32 EDT

## 2021-07-22 NOTE — DISCHARGE SUMMARY
Cory Elizalde  1945  [id number]    Hospitalists Discharge Summary    Date of Admission: 7/19/2021  Date of Discharge:  7/22/2021    Primary Discharge Diagnoses:  1.  Febrile Illness  2.  Altered Mental Status  3.  Lactic Acidosis    Secondary Discharge Diagnoses:  1.  Diabetes Mellitus, Type 2 A1c of 8.2%  2.  Parkinson's Disease  3.  Parkinson's Dementia    History of Present Illness (taken from H&P):  76 year old male with hx of T2DM,, Parkinson's Disease with dementia presents from the nursing home due to altered mental status.  Patient is a poor historian at baseline due to Parkinson's dementia and history taken from chart review.  Per nursing home the patient was less alert than usual today.  Upon my interview the patient is nonverbal at baseline.  He does not follow commands.  No nursing home staff to be able to provide any other history.     Hospital Course:  Mr. Elizalde was admitted to the Med/Surg unit and continued on IVF and broad-spectrum antibiotics.  His lactic acidosis resolved and could have been due to Metformin use as well as perhaps a little dehydration.  His mental status on my exam was appropriate and felt to be at baseline.  Procalcitonin trend remained relatively flat.  Culture data revealed no organisms.  His hemogram looked suspicious for viral illness, but no viral pathology isolated.  I stopped the Vancomycin as no MRSA was isolated.  He had no more fever after presentation.  I'm not sure what I was treating, but he appeared clinically better based on the limited information procured from the facility.  No witnessed aspiration.  Serial CXR were w/o infiltrate.  Will continue a short-course of Cefdinir.      PCP  Patient Care Team:  Provider, No Known as PCP - General    Consults:   Consults     No orders found from 6/20/2021 to 7/20/2021.          Operations and Procedures Performed:       XR Chest 1 View    Result Date: 7/21/2021  Narrative: CHEST X-RAY, 07/21/2021     HISTORY:  76-year-old male hospital inpatient with fever of unknown origin, mental status change.  TECHNIQUE: AP portable chest x-ray.  COMPARISON: *  Chest x-ray, 7/19/2021 and 7/4/2020.  FINDINGS: Shallow lung expansion with mild right basilar atelectasis, new since the prior exam. The lungs are otherwise clear. No dense airspace consolidation or pleural effusion.  Heart size and pulmonary vascularity are normal.  Middle mediastinal borders appear widened at the level of the ascending thoracic aorta, new since the 2020 exam but unchanged since 07/19/2021. Consider chest CT examination to assess for aortic dilatation or mediastinal mass if clinically indicated.      Impression: 1.  Shallow lung expansion with new mild right basilar atelectasis. Lungs otherwise clear. 2.  Radiographic widening of the right middle mediastinal border, new when compared with older studies. While this could be artifact of AP portable radiographic technique, chest CT examination may be helpful to assess for aortic dilatation or mediastinal mass if clinically indicated.  This report was finalized on 7/21/2021 7:50 AM by Dr. Yogesh Carlin MD.      XR Chest 1 View    Result Date: 7/19/2021  Narrative: CR Chest 1 Vw INDICATION: Altered mental status, fever COMPARISON:  Chest x-ray from 7/4/2020 FINDINGS: Single portable AP view(s) of the chest.  Apparent increased soft tissue density in the patient's right hilar and suprahilar region may be accentuated secondary to apical lordotic positioning and rotation.. The lungs are grossly clear. No pneumothorax or pleural effusion.     Impression: No definite acute cardiopulmonary findings. Apparent increased soft tissue density in the patient's right hilar and suprahilar region may be accentuated secondary to apical lordotic positioning and rotation. Signer Name: Lisa Baig MD  Signed: 7/19/2021 6:28 PM  Workstation Name: [id number]  [facility]    CT Head Without Contrast Stroke  Protocol    Result Date: 7/19/2021  Narrative: CT Head WO Code Stroke: 7/19/2021 7:10 PM HISTORY: Altered mental status, fever TECHNIQUE: Axial unenhanced head CT. Radiation dose reduction techniques included automated exposure control or exposure modulation based on body size. Radiation audit for number of CT and nuclear cardiology exams performed in the last year: 0.  COMPARISON: CT of the head from 7/4/2020. FINDINGS: Exam is limited by motion artifact. No intracranial hemorrhage, mass, or definite acute infarct. Atrophic and chronic small vessel disease changes again noted. No hydrocephalus or extra-axial fluid collection. The skull base, calvarium, and extracranial soft tissues are normal.     Impression: No acute intracranial abnormality given limitation of motion artifact. Signer Name: Lisa Baig MD  Signed: 7/19/2021 6:20 PM  Workstation Name: [id number]  [facility]      Allergies:  has No Known Allergies.    Jerry  reviewed    Discharge Medications:     Discharge Medications      New Medications      Instructions Start Date   cefdinir 300 MG capsule  Commonly known as: OMNICEF   300 mg, Oral, 2 Times Daily         Changes to Medications      Instructions Start Date   insulin aspart 100 UNIT/ML solution pen-injector sc pen  Commonly known as: novoLOG FLEXPEN  What changed: Another medication with the same name was removed. Continue taking this medication, and follow the directions you see here.   Subcutaneous, 4 Times Daily Before Meals & Nightly, -200= 2units; 201-250= 3units; 251-300=4units; 301-350=5units; 351-400= 6units; 401-450= 7units; greater than 450 call MD         Continue These Medications      Instructions Start Date   acetaminophen 500 MG tablet  Commonly known as: TYLENOL   500 mg, Oral, Every 6 Hours PRN      Benzalkonium Chloride 0.1 % liquid   Apply externally, Daily PRN, Perineum and buttocks.       Bisacodyl Laxative 10 MG suppository  Generic drug:  bisacodyl   10 mg, Rectal, Daily PRN      carbidopa-levodopa  MG per tablet  Commonly known as: SINEMET   1 tablet, Oral, 4 Times Daily      CARBOXYMETHYLCELL-GLYCERIN PF OP   1 drop, Ophthalmic, 2 times daily, Each eye       dimethicone 1.3 % lotion lotion  Commonly known as: AVEENO   1 application, Topical, Every 1 Hour PRN, Perineum and buttocks       Divalproex Sodium 125 MG capsule  Commonly known as: DEPAKOTE SPRINKLE   125 mg, Oral, 2 Times Daily      entacapone 200 MG tablet  Commonly known as: COMTAN   200 mg, Oral, 4 Times Daily      lactulose 10 GM/15ML solution  Commonly known as: CHRONULAC   20 g, Oral, Daily PRN      metFORMIN 500 MG tablet  Commonly known as: GLUCOPHAGE   500 mg, Oral, 2 Times Daily With Meals      QUEtiapine 25 MG tablet  Commonly known as: SEROquel   25 mg, Oral, Nightly      SODIUM PHOSPHATES RE   1 dose, Rectal, Daily PRN      Tubersol 5 UNIT/0.1ML injection  Generic drug: tuberculin   Intradermal, Once, Annually       vitamin B-12 100 MCG tablet  Commonly known as: CYANOCOBALAMIN   50 mcg, Oral, Daily         Stop These Medications    mirtazapine 15 MG tablet  Commonly known as: REMERON            Last Lab Results:   Lab Results (most recent)     Procedure Component Value Units Date/Time    POC Glucose Once [[id number]]  (Abnormal) Collected: 07/22/21 1201    Specimen: Blood Updated: 07/22/21 1207     Glucose 218 mg/dL      Comment: Meter: [id number] : [id number] Edward RENNER       Procalcitonin [[id number]]  (Abnormal) Collected: 07/22/21 0750    Specimen: Blood Updated: 07/22/21 1125     Procalcitonin 0.33 ng/mL     Narrative:      Results may be falsely decreased if patient taking Biotin.     CBC & Differential [[id number]]  (Abnormal) Collected: 07/22/21 0750    Specimen: Blood Updated: 07/22/21 0834    Narrative:      The following orders were created for panel order CBC & Differential.  Procedure                               Abnormality         Status                      ---------                               -----------         ------                     Scan Slide[[id number]]                   Normal              Final result               CBC Auto Differential[[id number]]        Abnormal            Final result                 Please view results for these tests on the individual orders.    Scan Slide [[id number]]  (Normal) Collected: 07/22/21 0750    Specimen: Blood Updated: 07/22/21 0834     RBC Morphology Normal     WBC Morphology Normal     Platelet Morphology Normal    Comprehensive Metabolic Panel [[id number]]  (Abnormal) Collected: 07/22/21 0750    Specimen: Blood Updated: 07/22/21 0817     Glucose 256 mg/dL      BUN 18 mg/dL      Creatinine 0.70 mg/dL      Sodium 135 mmol/L      Potassium 3.7 mmol/L      Chloride 99 mmol/L      CO2 28.8 mmol/L      Calcium 8.4 mg/dL      Total Protein 5.4 g/dL      Albumin 3.00 g/dL      ALT (SGPT) 10 U/L      AST (SGOT) 29 U/L      Alkaline Phosphatase 50 U/L      Total Bilirubin 0.4 mg/dL      eGFR Non African Amer 110 mL/min/1.73      Globulin 2.4 gm/dL      A/G Ratio 1.3 g/dL      BUN/Creatinine Ratio 25.7     Anion Gap 7.2 mmol/L     Narrative:      GFR Normal >60  Chronic Kidney Disease <60  Kidney Failure <15      CBC Auto Differential [[id number]]  (Abnormal) Collected: 07/22/21 0750    Specimen: Blood Updated: 07/22/21 0805     WBC 3.23 10*3/mm3      RBC 3.91 10*6/mm3      Hemoglobin 12.1 g/dL      Hematocrit 37.3 %      MCV 95.4 fL      MCH 30.9 pg      MCHC 32.4 g/dL      RDW 13.6 %      RDW-SD 47.8 fl      MPV 9.9 fL      Platelets 157 10*3/mm3      Neutrophil % 55.1 %      Lymphocyte % 30.0 %      Monocyte % 12.1 %      Eosinophil % 2.2 %      Basophil % 0.3 %      Immature Grans % 0.3 %      Neutrophils, Absolute 1.78 10*3/mm3      Lymphocytes, Absolute 0.97 10*3/mm3      Monocytes, Absolute 0.39 10*3/mm3      Eosinophils, Absolute 0.07 10*3/mm3      Basophils, Absolute 0.01 10*3/mm3      Immature Grans, Absolute 0.01  10*3/mm3      nRBC 0.0 /100 WBC     POC Glucose Once [[id number]]  (Abnormal) Collected: 07/22/21 0726    Specimen: Blood Updated: 07/22/21 0732     Glucose 250 mg/dL      Comment: Meter: [id number] : [id number] Edward RENNER       Blood Culture - Blood, Arm, Left [[id number]] Collected: 07/19/21 1825    Specimen: Blood from Arm, Left Updated: 07/21/21 1900     Blood Culture No growth at 2 days    Blood Culture - Blood, Arm, Right [[id number]] Collected: 07/19/21 1830    Specimen: Blood from Arm, Right Updated: 07/21/21 1900     Blood Culture No growth at 2 days    MRSA Screen Culture (Outpatient) - Swab, Nares [[id number]]  (Normal) Collected: 07/20/21 0826    Specimen: Swab from Nares Updated: 07/21/21 1130     MRSA Screen Cx No Methicillin Resistant Staphylococcus aureus isolated    Vancomycin, Random [[id number]]  (Normal) Collected: 07/21/21 0730    Specimen: Blood Updated: 07/21/21 0814     Vancomycin Random 11.10 mcg/mL     Narrative:      Therapeutic Ranges for Vancomycin    Vancomycin Random   5.0-40.0 mcg/mL  Vancomycin Trough   5.0-20.0 mcg/mL  Vancomycin Peak     20.0-40.0 mcg/mL    Procalcitonin [[id number]]  (Abnormal) Collected: 07/21/21 0730    Specimen: Blood Updated: 07/21/21 0804     Procalcitonin 0.39 ng/mL     Narrative:      Results may be falsely decreased if patient taking Biotin.     Comprehensive Metabolic Panel [[id number]]  (Abnormal) Collected: 07/21/21 0730    Specimen: Blood Updated: 07/21/21 0802     Glucose 298 mg/dL      BUN 19 mg/dL      Creatinine 0.70 mg/dL      Sodium 133 mmol/L      Potassium 3.5 mmol/L      Chloride 100 mmol/L      CO2 25.5 mmol/L      Calcium 8.2 mg/dL      Total Protein 5.7 g/dL      Albumin 3.30 g/dL      ALT (SGPT) 14 U/L      AST (SGOT) 25 U/L      Alkaline Phosphatase 49 U/L      Total Bilirubin 0.4 mg/dL      eGFR Non African Amer 110 mL/min/1.73      Globulin 2.4 gm/dL      A/G Ratio 1.4 g/dL      BUN/Creatinine Ratio 27.1     Anion Gap 7.5 mmol/L      Narrative:      GFR Normal >60  Chronic Kidney Disease <60  Kidney Failure <15      CBC & Differential [[id number]]  (Abnormal) Collected: 07/21/21 0730    Specimen: Blood Updated: 07/21/21 0745    Narrative:      The following orders were created for panel order CBC & Differential.  Procedure                               Abnormality         Status                     ---------                               -----------         ------                     CBC Auto Differential[[id number]]        Abnormal            Final result                 Please view results for these tests on the individual orders.    CBC Auto Differential [[id number]]  (Abnormal) Collected: 07/21/21 0730    Specimen: Blood Updated: 07/21/21 0745     WBC 2.52 10*3/mm3      RBC 3.66 10*6/mm3      Hemoglobin 11.7 g/dL      Hematocrit 35.5 %      MCV 97.0 fL      MCH 32.0 pg      MCHC 33.0 g/dL      RDW 13.6 %      RDW-SD 49.1 fl      MPV 9.9 fL      Platelets 150 10*3/mm3      Neutrophil % 61.1 %      Lymphocyte % 27.0 %      Monocyte % 10.3 %      Eosinophil % 0.4 %      Basophil % 0.8 %      Immature Grans % 0.4 %      Neutrophils, Absolute 1.54 10*3/mm3      Lymphocytes, Absolute 0.68 10*3/mm3      Monocytes, Absolute 0.26 10*3/mm3      Eosinophils, Absolute 0.01 10*3/mm3      Basophils, Absolute 0.02 10*3/mm3      Immature Grans, Absolute 0.01 10*3/mm3     Respiratory Panel, PCR (WITHOUT COVID) - Swab, Nasopharynx [[id number]]  (Normal) Collected: 07/20/21 0826    Specimen: Swab from Nasopharynx Updated: 07/20/21 1130     ADENOVIRUS, PCR Not Detected     Coronavirus 229E Not Detected     Coronavirus HKU1 Not Detected     Coronavirus NL63 Not Detected     Coronavirus OC43 Not Detected     Human Metapneumovirus Not Detected     Human Rhinovirus/Enterovirus Not Detected     Influenza B PCR Not Detected     Parainfluenza Virus 1 Not Detected     Parainfluenza Virus 2 Not Detected     Parainfluenza Virus 3 Not Detected     Parainfluenza Virus 4 Not  Detected     Bordetella pertussis pcr Not Detected     Chlamydophila pneumoniae PCR Not Detected     Mycoplasma pneumo by PCR Not Detected     Influenza A PCR Not Detected     RSV, PCR Not Detected     Bordetella parapertussis PCR Not Detected    Narrative:      The coronavirus on the RVP is NOT COVID-19 and is NOT indicative of infection with COVID-19.    In the setting of a positive respiratory panel with a viral infection PLUS a negative procalcitonin without other underlying concern for bacterial infection, consider observing off antibiotics or discontinuation of antibiotics and continue supportive care. If the respiratory panel is positive for atypical bacterial infection (Bordetella pertussis, Chlamydophila pneumoniae, or Mycoplasma pneumoniae), consider antibiotic de-escalation to target atypical bacterial infection.    Hemoglobin A1c [[id number]]  (Abnormal) Collected: 07/20/21 0330    Specimen: Blood Updated: 07/20/21 1017     Hemoglobin A1C 8.20 %     Narrative:      Hemoglobin A1C Ranges:    Increased Risk for Diabetes  5.7% to 6.4%  Diabetes                     >= 6.5%  Diabetic Goal                < 7.0%    Basic Metabolic Panel [[id number]]  (Abnormal) Collected: 07/20/21 0330    Specimen: Blood Updated: 07/20/21 0527     Glucose 343 mg/dL      BUN 20 mg/dL      Creatinine 0.69 mg/dL      Sodium 140 mmol/L      Potassium 3.6 mmol/L      Chloride 105 mmol/L      CO2 22.1 mmol/L      Calcium 8.1 mg/dL      eGFR Non African Amer 112 mL/min/1.73      BUN/Creatinine Ratio 29.0     Anion Gap 12.9 mmol/L     Narrative:      GFR Normal >60  Chronic Kidney Disease <60  Kidney Failure <15      STAT Lactic Acid, Reflex [[id number]]  (Normal) Collected: 07/19/21 2133    Specimen: Blood Updated: 07/19/21 2205     Lactate 1.5 mmol/L     COVID-19 and FLU A/B PCR - Swab, Nasopharynx [[id number]]  (Normal) Collected: 07/19/21 1846    Specimen: Swab from Nasopharynx Updated: 07/19/21 1926     COVID19 Not Detected      Influenza A PCR Not Detected     Influenza B PCR Not Detected    Narrative:      Fact sheet for providers: https://www.fda.gov/media/751480/download    Fact sheet for patients: https://www.fda.gov/media/250099/download    Test performed by PCR.    Urinalysis, Microscopic Only - Urine, Catheter [[id number]]  (Abnormal) Collected: 07/19/21 1831    Specimen: Urine, Catheter Updated: 07/19/21 1918     RBC, UA 31-50 /HPF      WBC, UA 3-5 /HPF      Bacteria, UA 1+ /HPF      Squamous Epithelial Cells, UA 3-6 /HPF      Hyaline Casts, UA None Seen /LPF      Methodology Manual Light Microscopy    Lactic Acid, Plasma [[id number]]  (Abnormal) Collected: 07/19/21 1830    Specimen: Blood Updated: 07/19/21 1908     Lactate 4.1 mmol/L     Urinalysis With Culture If Indicated - Urine, Catheter [[id number]]  (Abnormal) Collected: 07/19/21 1831    Specimen: Urine, Catheter Updated: 07/19/21 1907     Color, UA Yellow     Appearance, UA Clear     pH, UA <=5.0     Specific Gravity, UA 1.020     Glucose, UA >=1000 mg/dL (3+)     Ketones, UA 40 mg/dL (2+)     Bilirubin, UA Negative     Blood, UA Large (3+)     Protein, UA Negative     Leuk Esterase, UA Negative     Nitrite, UA Negative     Urobilinogen, UA 0.2 E.U./dL    Protime-INR [[id number]]  (Normal) Collected: 07/19/21 1830    Specimen: Blood Updated: 07/19/21 1900     Protime 13.4 Seconds      INR 1.02    Narrative:      Therapeutic Ranges for INR: 2.0-3.0 (PT 20-30)                              2.5-3.5 (PT 25-34)    aPTT [[id number]]  (Normal) Collected: 07/19/21 1830    Specimen: Blood Updated: 07/19/21 1900     PTT 33.7 seconds     Narrative:      PTT = The equivalent PTT values for the therapeutic range of heparin levels at 0.1 to 0.7 U/ml are 53 to 110 seconds.          Imaging Results (Most Recent)     Procedure Component Value Units Date/Time    XR Chest 1 View [[id number]] Collected: 07/21/21 0741     Updated: 07/21/21 0753    Narrative:      CHEST X-RAY, 07/21/2021          HISTORY:  76-year-old male hospital inpatient with fever of unknown origin, mental  status change.     TECHNIQUE:  AP portable chest x-ray.     COMPARISON:  *  Chest x-ray, 7/19/2021 and 7/4/2020.     FINDINGS:  Shallow lung expansion with mild right basilar atelectasis, new since  the prior exam. The lungs are otherwise clear. No dense airspace  consolidation or pleural effusion.     Heart size and pulmonary vascularity are normal.     Middle mediastinal borders appear widened at the level of the ascending  thoracic aorta, new since the 2020 exam but unchanged since 07/19/2021.  Consider chest CT examination to assess for aortic dilatation or  mediastinal mass if clinically indicated.       Impression:      1.  Shallow lung expansion with new mild right basilar atelectasis.  Lungs otherwise clear.  2.  Radiographic widening of the right middle mediastinal border, new  when compared with older studies. While this could be artifact of AP  portable radiographic technique, chest CT examination may be helpful to  assess for aortic dilatation or mediastinal mass if clinically  indicated.     This report was finalized on 7/21/2021 7:50 AM by Dr. Yogesh Carlin MD.       XR Chest 1 View [[id number]] Collected: 07/19/21 1828     Updated: 07/19/21 1830    Narrative:      CR Chest 1 Vw    INDICATION:   Altered mental status, fever     COMPARISON:    Chest x-ray from 7/4/2020    FINDINGS:  Single portable AP view(s) of the chest.  Apparent increased soft tissue density in the patient's right hilar and suprahilar region may be accentuated secondary to apical lordotic positioning and rotation.. The lungs are grossly clear. No pneumothorax or  pleural effusion.      Impression:      No definite acute cardiopulmonary findings. Apparent increased soft tissue density in the patient's right hilar and suprahilar region may be accentuated secondary to apical lordotic positioning and rotation.    Signer Name: Lisa Baig MD    Signed: 7/19/2021 6:28 PM   Workstation Name: [email or web address]    [facility]    CT Head Without Contrast Stroke Protocol [[id number]] Collected: 07/19/21 1820     Updated: 07/19/21 1822    Narrative:      CT Head WO Code Stroke: 7/19/2021 7:10 PM    HISTORY:   Altered mental status, fever    TECHNIQUE:   Axial unenhanced head CT. Radiation dose reduction techniques included automated exposure control or exposure modulation based on body size. Radiation audit for number of CT and nuclear cardiology exams performed in the last year: 0.      COMPARISON:   CT of the head from 7/4/2020.    FINDINGS:   Exam is limited by motion artifact. No intracranial hemorrhage, mass, or definite acute infarct. Atrophic and chronic small vessel disease changes again noted. No hydrocephalus or extra-axial fluid collection. The skull base, calvarium, and extracranial  soft tissues are normal.      Impression:      No acute intracranial abnormality given limitation of motion artifact.      Signer Name: Lisa Baig MD   Signed: 7/19/2021 6:20 PM   Workstation Name: [id number]    [facility]          PROCEDURES      Condition on Discharge:  Stable    Physical Exam at Discharge  Vital Signs  Temp:  [97.1 °F (36.2 °C)-98.6 °F (37 °C)] 98.3 °F (36.8 °C)  Heart Rate:  [59-80] 59  Resp:  [16-18] 18  BP: ()/(49-64) 98/62    Physical Exam:  Physical Exam   Constitutional: No acute distress   Cardiovascular: Regular rate, regular rhythm, S1 normal and S2 normal.  Exam reveals no gallop and no friction rub.  No murmur heard.  Pulmonary/Chest: Lungs are clear to auscultation bilaterally. No respiratory distress. No wheezes. No rhonchi. No rales.   Abdominal: Soft. Bowel sounds are normal. There is no tenderness.   Musculoskeletal: Normal Muscle tone  Extremities: No edema. No asymmetry.  Neurological: Cranial nerves II-XII are grossly intact with no focal deficits.    Discharge  Disposition  [place]    Visiting Nurse:    No     Home PT/OT:  No     Home Safety Evaluation:  No     DME  None    Discharge Diet:      Dietary Orders (From admission, onward)     Start     Ordered    07/20/21 1521  Diet Pureed; Cardiac, Consistent Carbohydrate  Diet Effective Now     Question Answer Comment   Diet Texture / Consistency Pureed    Common Modifiers Cardiac    Common Modifiers Consistent Carbohydrate        07/20/21 1521                Activity at Discharge:  As tolerated    Follow-up Appointments  No future appointments.  Additional Instructions for the Follow-ups that You Need to Schedule     Discharge Follow-up with PCP   As directed       Currently Documented PCP:    Provider, No Known    PCP Phone Number:    [phone]     Follow Up Details: [facility] within 1-2 weeks               Test Results Pending at Discharge  Pending Labs     Order Current Status    Blood Culture - Blood, Arm, Left Preliminary result    Blood Culture - Blood, Arm, Right Preliminary result           Genaro Mendez MD  07/22/21  13:54 EDT    Time: <30 minutes

## 2021-07-23 NOTE — CASE MANAGEMENT/SOCIAL WORK
Case Management Discharge Note      Final Note: Discharged to [facility]    Provided Post Acute Provider List?: N/A  Provided Post Acute Provider Quality & Resource List?: N/A    Selected Continued Care - Discharged on 7/22/2021 Admission date: 7/19/2021 - Discharge disposition: Long Term Care (DC - External)    Destination    No services have been selected for the patient.              Durable Medical Equipment    No services have been selected for the patient.              Dialysis/Infusion    No services have been selected for the patient.              Home Medical Care    No services have been selected for the patient.              Therapy    No services have been selected for the patient.              Community Resources    No services have been selected for the patient.              Community & DME    No services have been selected for the patient.                       Final Discharge Disposition Code: 03 - skilled nursing facility (SNF)

## 2021-07-24 LAB
BACTERIA SPEC AEROBE CULT: NORMAL
BACTERIA SPEC AEROBE CULT: NORMAL

## 2021-11-24 ENCOUNTER — LAB REQUISITION (OUTPATIENT)
Dept: LAB | Facility: HOSPITAL | Age: 76
End: 2021-11-24

## 2021-11-24 DIAGNOSIS — R06.7 SNEEZING: ICD-10-CM

## 2021-11-24 DIAGNOSIS — R09.81 NASAL CONGESTION: ICD-10-CM

## 2021-11-24 DIAGNOSIS — R09.89 OTHER SPECIFIED SYMPTOMS AND SIGNS INVOLVING THE CIRCULATORY AND RESPIRATORY SYSTEMS: ICD-10-CM

## 2021-11-24 DIAGNOSIS — R05.9 COUGH, UNSPECIFIED: ICD-10-CM

## 2021-11-24 LAB
B PARAPERT DNA SPEC QL NAA+PROBE: NOT DETECTED
B PERT DNA SPEC QL NAA+PROBE: NOT DETECTED
C PNEUM DNA NPH QL NAA+NON-PROBE: NOT DETECTED
FLUAV SUBTYP SPEC NAA+PROBE: NOT DETECTED
FLUBV RNA ISLT QL NAA+PROBE: NOT DETECTED
HADV DNA SPEC NAA+PROBE: NOT DETECTED
HCOV 229E RNA SPEC QL NAA+PROBE: NOT DETECTED
HCOV HKU1 RNA SPEC QL NAA+PROBE: NOT DETECTED
HCOV NL63 RNA SPEC QL NAA+PROBE: NOT DETECTED
HCOV OC43 RNA SPEC QL NAA+PROBE: NOT DETECTED
HMPV RNA NPH QL NAA+NON-PROBE: NOT DETECTED
HPIV1 RNA ISLT QL NAA+PROBE: NOT DETECTED
HPIV2 RNA SPEC QL NAA+PROBE: NOT DETECTED
HPIV3 RNA NPH QL NAA+PROBE: NOT DETECTED
HPIV4 P GENE NPH QL NAA+PROBE: NOT DETECTED
M PNEUMO IGG SER IA-ACNC: NOT DETECTED
RHINOVIRUS RNA SPEC NAA+PROBE: DETECTED
RSV RNA NPH QL NAA+NON-PROBE: NOT DETECTED

## 2021-11-24 PROCEDURE — 87633 RESP VIRUS 12-25 TARGETS: CPT | Performed by: FAMILY MEDICINE
